# Patient Record
Sex: FEMALE | Race: WHITE | Employment: OTHER | URBAN - METROPOLITAN AREA
[De-identification: names, ages, dates, MRNs, and addresses within clinical notes are randomized per-mention and may not be internally consistent; named-entity substitution may affect disease eponyms.]

---

## 2017-10-25 ENCOUNTER — APPOINTMENT (OUTPATIENT)
Dept: GENERAL RADIOLOGY | Age: 82
End: 2017-10-25
Attending: EMERGENCY MEDICINE
Payer: MEDICARE

## 2017-10-25 ENCOUNTER — HOSPITAL ENCOUNTER (EMERGENCY)
Age: 82
Discharge: HOME | End: 2017-10-26
Payer: MEDICARE
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
## 2017-10-25 DIAGNOSIS — S32.010D CLOSED COMPRESSION FRACTURE OF L1 LUMBAR VERTEBRA WITH ROUTINE HEALING, SUBSEQUENT ENCOUNTER: ICD-10-CM

## 2017-10-25 PROCEDURE — 96375 TX/PRO/DX INJ NEW DRUG ADDON: CPT

## 2017-10-25 PROCEDURE — 72100 X-RAY EXAM L-S SPINE 2/3 VWS: CPT

## 2017-10-25 PROCEDURE — 74011250637 HC RX REV CODE- 250/637

## 2017-10-25 PROCEDURE — 99285 EMERGENCY DEPT VISIT HI MDM: CPT

## 2017-10-25 PROCEDURE — 96374 THER/PROPH/DIAG INJ IV PUSH: CPT

## 2017-10-25 RX ORDER — HYDROCODONE BITARTRATE AND ACETAMINOPHEN 7.5; 325 MG/1; MG/1
1 TABLET ORAL
Status: COMPLETED | OUTPATIENT
Start: 2017-10-25 | End: 2017-10-25

## 2017-10-25 RX ORDER — HYDROCODONE BITARTRATE AND ACETAMINOPHEN 7.5; 325 MG/1; MG/1
1 TABLET ORAL
Qty: 20 TAB | Refills: 0 | Status: SHIPPED | OUTPATIENT
Start: 2017-10-25 | End: 2018-01-08

## 2017-10-25 RX ADMIN — HYDROCODONE BITARTRATE AND ACETAMINOPHEN 1 TABLET: 7.5; 325 TABLET ORAL at 23:56

## 2017-10-26 ENCOUNTER — HOME HEALTH ADMISSION (OUTPATIENT)
Dept: HOME HEALTH SERVICES | Facility: HOME HEALTH | Age: 82
End: 2017-10-26
Payer: MEDICARE

## 2017-10-26 VITALS
OXYGEN SATURATION: 99 % | RESPIRATION RATE: 16 BRPM | BODY MASS INDEX: 29.2 KG/M2 | HEART RATE: 93 BPM | DIASTOLIC BLOOD PRESSURE: 59 MMHG | WEIGHT: 204 LBS | SYSTOLIC BLOOD PRESSURE: 121 MMHG | TEMPERATURE: 97.6 F | HEIGHT: 70 IN

## 2017-10-26 PROCEDURE — 96375 TX/PRO/DX INJ NEW DRUG ADDON: CPT

## 2017-10-26 PROCEDURE — 96374 THER/PROPH/DIAG INJ IV PUSH: CPT

## 2017-10-26 PROCEDURE — 74011250636 HC RX REV CODE- 250/636

## 2017-10-26 RX ORDER — HYDROMORPHONE HYDROCHLORIDE 1 MG/ML
1 INJECTION, SOLUTION INTRAMUSCULAR; INTRAVENOUS; SUBCUTANEOUS
Status: COMPLETED | OUTPATIENT
Start: 2017-10-26 | End: 2017-10-26

## 2017-10-26 RX ORDER — ONDANSETRON 2 MG/ML
4 INJECTION INTRAMUSCULAR; INTRAVENOUS
Status: COMPLETED | OUTPATIENT
Start: 2017-10-26 | End: 2017-10-26

## 2017-10-26 RX ADMIN — ONDANSETRON 4 MG: 2 INJECTION INTRAMUSCULAR; INTRAVENOUS at 00:51

## 2017-10-26 RX ADMIN — HYDROMORPHONE HYDROCHLORIDE 1 MG: 1 INJECTION, SOLUTION INTRAMUSCULAR; INTRAVENOUS; SUBCUTANEOUS at 00:51

## 2017-10-26 NOTE — PROGRESS NOTES
[address]  Face to Face Encounter    Patients Name: Miracle Davis    YOB: 1923    Ordering Physician: Johnny De La Vega    Primary Diagnosis: Fall, Compression fracture of L1 lumbar vertebra with routine healing  Date of Face to Face:   10/25/17                                  Face to Face Encounter findings are related to primary reason for home care:   yes. 1. I certify that the patient needs intermittent care as follows: physical therapy: strengthening, stretching/ROM, transfer training and balance training    2. I certify that this patient is homebound, that is: 1) patient requires the use of a walker device, special transportation, or assistance of another to leave the home; or 2) patient's condition makes leaving the home medically contraindicated; and 3) patient has a normal inability to leave the home and leaving the home requires considerable and taxing effort. Patient may leave the home for infrequent and short duration for medical reasons, and occasional absences for non-medical reasons. Homebound status is due to the following functional limitations: Patient with strength deficits limiting the performance of all ADL's without caregiver assistance or the use of an assistive device. Patient with poor safety awareness and is at risk for falls without assistance of another person and the use of an assistive device. Patient with poor ambulation endurance limiting their safe ability to ascend/descend the required number of steps to leave the home. 3. I certify that this patient is under my care and that I, or a nurse practitioner or [phone], or clinical nurse specialist, or certified nurse midwife, working with me, had a Face-to-Face Encounter that meets the physician Face-to-Face Encounter requirements.   The following are the clinical findings from the [address] [place] [address] encounter that support the need for skilled services and is a summary of the encounter: see progress notes     See attached progess note and summary of the patient's illness      Killian Clark RN  10/26/2017      THE FOLLOWING TO BE COMPLETED BY THE COMMUNITY PHYSICIAN:    I concur with the findings described above from the F2F encounter that this patient is homebound and in need of a skilled service.     Certifying Physician: _____________________________________      Printed Certifying Physician Name: _____________________________________    Date: _________________

## 2017-10-26 NOTE — ED NOTES
I have reviewed discharge instructions with the patient. The patient verbalized understanding. Patient left ED via Discharge Method: stretcher to Home with thorn transport    Opportunity for questions and clarification provided. Patient given 1 scripts.

## 2017-10-26 NOTE — PROGRESS NOTES
Referral from ED noting 'patient lives alone and has fallen'. Per Dr Vasquez Covert notes would like home health PT. Call to son who states that patient has lived alone for many years and up until 2 days ago was very independent in her ADLs. States she does not drive but she showers, dresses and prepares her meals on her own. States she uses a walker for ambulation. States he drives her where she needs to go and brings her groceries a couple of times a week. States he is an only child. States patient has old compression fractures and she was washing windows the other day and 'pinched' something. Since then has had significant pain and is confined to bed with commode next to it. Son states she has an appointment with her PCP on Monday and would love for PT to come in and help her in the meantime. Denies need for nurses aide - states he will take care of her. Son does not have agency preference so notified him that I would make referral to [facility] and someone would be calling him. Call to Renée Le at [facility] and referral made.

## 2017-10-26 NOTE — ED PROVIDER NOTES
HPI Comments: 77-year-old female complaining of low back pain. Patient fell today. She was using her walker when she lost her balance and legs gave out. Patient's had this happen several times in the past.  Patient has a history of chronic back pain. Pain is located in a similar placed her previous pain. Patient is a 80 y.o. female presenting with back pain. The history is provided by the patient and a relative. Back Pain    This is a recurrent problem. The problem has been gradually worsening. The problem occurs constantly. Patient reports not work related injury. The pain is associated with a fall. The pain is present in the lower back. The quality of the pain is described as aching. The pain does not radiate. The pain is at a severity of 8/10. The pain is moderate. Pertinent negatives include no chest pain, no fever, no numbness, no weight loss, no headaches, no abdominal pain, no abdominal swelling, no bowel incontinence, no perianal numbness, no bladder incontinence, no dysuria, no pelvic pain, no leg pain, no paresthesias, no paresis, no tingling and no weakness. She has tried nothing for the symptoms.         Past Medical History:   Diagnosis Date    Compression fracture of lumbar spine, non-traumatic ([facility] [address] 2007    Depression 9/18/2012    DJD (degenerative joint disease) 9/18/2012    GERD (gastroesophageal reflux disease) 9/18/2012    HTN (hypertension) 9/18/2012    Hypertension     NATALIIA on CPAP 9/18/2012    Osteoporosis 9/18/2012    Other ill-defined conditions(799.89)     back injury in Oct    Psychiatric disorder     anixety       Past Surgical History:   Procedure Laterality Date    HX CATARACT REMOVAL      HX HEENT      tonsillectomy         Family History:   Problem Relation Age of Onset    Stroke Mother        Social History     Social History    Marital status:      Spouse name: N/A    Number of children: N/A    Years of education: N/A     Occupational History    Not on file.     Social History Main Topics    Smoking status: Never Smoker    Smokeless tobacco: Never Used    Alcohol use No    Drug use: No    Sexual activity: No     Other Topics Concern    Not on file     Social History Narrative    / 3 children         ALLERGIES: Review of patient's allergies indicates no known allergies. Review of Systems   Constitutional: Negative. Negative for activity change, fever and weight loss. HENT: Negative. Eyes: Negative. Respiratory: Negative. Cardiovascular: Negative. Negative for chest pain. Gastrointestinal: Negative. Negative for abdominal pain and bowel incontinence. Genitourinary: Negative. Negative for bladder incontinence, dysuria and pelvic pain. Musculoskeletal: Positive for back pain. Skin: Negative. Neurological: Negative. Negative for tingling, weakness, numbness, headaches and paresthesias. Psychiatric/Behavioral: Negative. All other systems reviewed and are negative. Vitals:    10/25/17 2239   Pulse: 90   Resp: 16   Temp: 97.6 °F (36.4 °C)   SpO2: 90%   Weight: 92.5 kg (204 lb)   Height: 5' 10\" (1.778 m)            Physical Exam   Constitutional: She is oriented to person, place, and time. She appears well-developed and well-nourished. No distress. HENT:   Head: Normocephalic and atraumatic. Right Ear: External ear normal.   Left Ear: External ear normal.   Nose: Nose normal.   Mouth/Throat: Oropharynx is clear and moist. No oropharyngeal exudate. Eyes: Conjunctivae and EOM are normal. Pupils are equal, round, and reactive to light. Right eye exhibits no discharge. Left eye exhibits no discharge. No scleral icterus. Neck: Normal range of motion. Neck supple. No JVD present. No tracheal deviation present. Cardiovascular: Normal rate, regular rhythm and intact distal pulses. Pulmonary/Chest: Effort normal and breath sounds normal. No stridor. No respiratory distress. She has no wheezes.  She exhibits no tenderness. Abdominal: Soft. Bowel sounds are normal. She exhibits no distension and no mass. There is no tenderness. Musculoskeletal: Normal range of motion. She exhibits no edema or tenderness. Neurological: She is alert and oriented to person, place, and time. No cranial nerve deficit. Skin: Skin is warm and dry. No rash noted. She is not diaphoretic. No erythema. No pallor. Psychiatric: She has a normal mood and affect. Her behavior is normal. Thought content normal.   Nursing note and vitals reviewed. MDM  Number of Diagnoses or Management Options  Diagnosis management comments: Pain is over L1 which is reportedly a chronic compression fracture of L1. Patient's neurovascularly intact. Do not see a standard hospital admission criteria. We will ask home health to visit the patient for assistance in ambulation and physical therapy. We will try to get her pain under control and have her follow-up with her primary care physician.        Amount and/or Complexity of Data Reviewed  Tests in the radiology section of CPT®: ordered and reviewed      ED Course       Procedures

## 2017-10-26 NOTE — DISCHARGE INSTRUCTIONS
Compression Fracture of the Spine: Care Instructions  Your Care Instructions    A compression fracture happens when the front part of a spinal bone breaks and collapses. A fall or other accident can cause it. A minor injury or moving the wrong way can cause a break if you have thin or brittle bones (osteoporosis). These types of breaks will heal in 8 to 10 weeks. You will need rest and pain medicines. Your doctor may recommend physical therapy. Some doctors recommend that certain people with compression fractures wear braces. Your doctor also may treat thin or brittle bones. You may need surgery if you have lasting pain or if the bone presses on the spinal cord or nerves. You heal best when you take good care of yourself. Eat a variety of healthy foods, and don't smoke. Follow-up care is a key part of your treatment and safety. Be sure to make and go to all appointments, and call your doctor if you are having problems. It's also a good idea to know your test results and keep a list of the medicines you take. How can you care for yourself at home? · Be safe with medicines. Read and follow all instructions on the label. ¨ If the doctor gave you a prescription medicine for pain, take it as prescribed. ¨ If you are not taking a prescription pain medicine, ask your doctor if you can take an over-the-counter medicine. · Talk to your doctor about how to make your bones stronger. You may need medicines or a change in what you eat. · Be active only as directed by your doctor. When should you call for help? Call 911 anytime you think you may need emergency care. For example, call if:  ? · You are unable to move an arm or a leg at all. ?Call your doctor now or seek immediate medical care if:  ? · You have new or worse symptoms in your arms, legs, belly, or buttocks. Symptoms may include:  ¨ Numbness or tingling. ¨ Weakness. ¨ Pain. ? · You lose bladder or bowel control.    ? · You have belly pain, bloating, vomiting, or nausea. ? Watch closely for changes in your health, and be sure to contact your doctor if:  ? · You do not get better as expected. Where can you learn more? Go to http://di-angy.info/. Enter P445 in the search box to learn more about \"Compression Fracture of the Spine: Care Instructions. \"  Current as of: March 21, 2017  Content Version: 11.4  © 4278-6226 [employer]. Care instructions adapted under license by [employer] (which disclaims liability or warranty for this information). If you have questions about a medical condition or this instruction, always ask your healthcare professional. [place] [address] any warranty or liability for your use of this information. Preventing Falls: Care Instructions  Your Care Instructions    Getting around your home safely can be a challenge if you have injuries or health problems that make it easy for you to fall. Loose rugs and furniture in walkways are among the dangers for many older people who have problems walking or who have poor eyesight. People who have conditions such as arthritis, osteoporosis, or dementia also have to be careful not to fall. You can make your home safer with a few simple measures. Follow-up care is a key part of your treatment and safety. Be sure to make and go to all appointments, and call your doctor if you are having problems. It's also a good idea to know your test results and keep a list of the medicines you take. How can you care for yourself at home? Taking care of yourself  · You may get dizzy if you do not drink enough water. To prevent dehydration, drink plenty of fluids, enough so that your urine is light yellow or clear like water. Choose water and other caffeine-free clear liquids. If you have kidney, heart, or liver disease and have to limit fluids, talk with your doctor before you increase the amount of fluids you drink.   · Exercise regularly to improve your strength, muscle tone, and balance. Walk if you can. Swimming may be a good choice if you cannot walk easily. · Have your vision and hearing checked each year or any time you notice a change. If you have trouble seeing and hearing, you might not be able to avoid objects and could lose your balance. · Know the side effects of the medicines you take. Ask your doctor or pharmacist whether the medicines you take can affect your balance. Sleeping pills or sedatives can affect your balance. · Limit the amount of alcohol you drink. Alcohol can impair your balance and other senses. · Ask your doctor whether calluses or corns on your feet need to be removed. If you wear loose-fitting shoes because of calluses or corns, you can lose your balance and fall. · Talk to your doctor if you have numbness in your feet. Preventing falls at home  · Remove raised doorway thresholds, throw rugs, and clutter. Repair loose carpet or raised areas in the floor. · Move furniture and electrical cords to keep them out of walking paths. · Use nonskid floor wax, and wipe up spills right away, especially on ceramic tile floors. · If you use a walker or cane, put rubber tips on it. If you use crutches, clean the bottoms of them regularly with an abrasive pad, such as steel wool. · Keep your house well lit, especially [place] [employer], and outside walkways. Use night-lights in areas such as hallways and bathrooms. Add extra light switches or use remote switches (such as switches that go on or off when you clap your hands) to make it easier to turn lights on if you have to get up during the night. · Install sturdy handrails on stairways. · Move items in your cabinets so that the things you use a lot are on the lower shelves (about waist level). · Keep a cordless phone and a flashlight with new batteries by your bed.  If possible, put a phone in each of the main rooms of your house, or carry a cell phone in case you fall and cannot reach a phone. Or, you can wear a device around your neck or wrist. You push a button that sends a signal for help. · Wear low-heeled shoes that fit well and give your feet good support. Use footwear with nonskid soles. Check the heels and soles of your shoes for wear. Repair or replace worn heels or soles. · Do not wear socks without shoes on wood floors. · Walk on the grass when the sidewalks are slippery. If you live in an area that gets snow and ice in the winter, sprinkle salt on slippery steps and sidewalks. Preventing falls in the bath  · Install grab bars and nonskid mats inside and outside your shower or tub and near the toilet and sinks. · Use shower chairs and bath benches. · Use a hand-held shower head that will allow you to sit while showering. · Get into a tub or shower by putting the weaker leg in first. Get out of a tub or shower with your strong side first.  · Repair loose toilet seats and consider installing a raised toilet seat to make getting on and off the toilet easier. · Keep your bathroom door unlocked while you are in the shower. Where can you learn more? Go to http://di-angy.info/. Enter 0476 [id number] 69 [id number] in the search box to learn more about \"Preventing Falls: Care Instructions. \"  Current as of: May 12, 2017  Content Version: 11.4  © 6268-6423 [employer], [facility]. Care instructions adapted under license by [employer] (which disclaims liability or warranty for this information). If you have questions about a medical condition or this instruction, always ask your healthcare professional. [place] [address] any warranty or liability for your use of this information.

## 2017-10-26 NOTE — ED NOTES
Told son to go home. We will send pt by rafael when pain is under control. Oral med not working. Notified md. Orders received and meds given.

## 2017-10-26 NOTE — ED TRIAGE NOTES
Per EMS,  Pt has been having lower back pain that she states felt like a nerve \"pop'.   Pt did fall tonight and now c/o lower back pain

## 2017-10-28 ENCOUNTER — HOME CARE VISIT (OUTPATIENT)
Dept: HOME HEALTH SERVICES | Facility: HOME HEALTH | Age: 82
End: 2017-10-28

## 2017-10-28 ENCOUNTER — HOME CARE VISIT (OUTPATIENT)
Dept: SCHEDULING | Facility: HOME HEALTH | Age: 82
End: 2017-10-28
Payer: MEDICARE

## 2017-10-28 VITALS
SYSTOLIC BLOOD PRESSURE: 150 MMHG | OXYGEN SATURATION: 95 % | DIASTOLIC BLOOD PRESSURE: 70 MMHG | HEART RATE: 93 BPM | TEMPERATURE: 96 F | RESPIRATION RATE: 20 BRPM

## 2017-10-28 PROCEDURE — 3331090001 HH PPS REVENUE CREDIT

## 2017-10-28 PROCEDURE — 3331090002 HH PPS REVENUE DEBIT

## 2017-10-28 PROCEDURE — G0151 HHCP-SERV OF PT,EA 15 MIN: HCPCS

## 2017-10-28 PROCEDURE — 400013 HH SOC

## 2017-10-29 PROCEDURE — 3331090001 HH PPS REVENUE CREDIT

## 2017-10-29 PROCEDURE — 3331090002 HH PPS REVENUE DEBIT

## 2017-10-30 ENCOUNTER — PATIENT OUTREACH (OUTPATIENT)
Dept: CASE MANAGEMENT | Age: 82
End: 2017-10-30

## 2017-10-30 PROCEDURE — 3331090001 HH PPS REVENUE CREDIT

## 2017-10-30 PROCEDURE — 3331090002 HH PPS REVENUE DEBIT

## 2017-10-30 NOTE — PROGRESS NOTES
Date/Time of Call:       10/27/17 10:20 AM   What was the patient seen in the ED for? Patient was seen in ED for diagnosis of Fall, closed compression    Does the patient understand his/her diagnosis and/or treatment and what happened during the ED visit? Spoke with patient who stated understanding of treatment and diagnosis. Did the patient receive discharge instructions from the ED? Patient stated receiving d/c instructions from the ED. Review any discharge instructions (see notes in [facility]). Ask patient if they understand these. Do they have any questions? Patient and Care Coordinator reviewed DC instructions. Patient stated understanding and no questions asked. Were home services ordered (nursing, PT, OT, ST, etc.)? No HH ordered at d/c   If so, has the first visit occurred? If not, why? (Assist with coordination of services if necessary.) N/A   DME ordered at d/c? No DME ordered at d/c. If so, has it been received? If not, why?  (Assist with coordination of arranging DME orders if necessary.) N/A   Complete a review of all medications (new, continued and discontinued meds per the D/C instructions and medication tab in [address] [place] [address]). Review of medications has been completed by Patient and Care Coordinator. Were all new prescriptions filled? If not, why?  (Assist with obtainment of medications if necessary.) N/A   Does the patient understand the purpose and dosing instructions for all medications? (If patient has questions, provide explanation and education.) Patient stated understanding of purpose, and dosing instructions for all medications. [place] prescribed at d/c. Does the patient have any problems in performing ADLs? (If patient is unable to perform ADLs  what is the limiting factor(s)?   Do they have a support system that can assist? If no support system is present, discuss possible assistance that they may be able to obtain.) Patient states she is independent with all ADLs. Does the patient have all follow-up appointments scheduled? Has transportation been arranged? 7 day f/up with PCP?    7-14 day f/up with specialist?    If f/up has not been made  what actions has the care coordinator made to accomplish this? Has transportation been arranged? [facility] follow-up should be within 7 days of discharge; all others should have PCP follow-up within 7 days of discharge; follow-ups with other specialists as appropriate or ordered.) PCP appt. 10/30. Patient denies need for transportation. Any other questions or concerns expressed by the patient? No other questions asked. No further needs identified. Gratitude expressed for care and call. RENETTA Call Completed By: Mario Cra LPN   Care Coordinator  [facility]                                 This note will not be viewable in [address]

## 2017-10-31 ENCOUNTER — HOME CARE VISIT (OUTPATIENT)
Dept: SCHEDULING | Facility: HOME HEALTH | Age: 82
End: 2017-10-31
Payer: MEDICARE

## 2017-10-31 VITALS
SYSTOLIC BLOOD PRESSURE: 122 MMHG | DIASTOLIC BLOOD PRESSURE: 70 MMHG | OXYGEN SATURATION: 90 % | HEART RATE: 78 BPM | RESPIRATION RATE: 17 BRPM | TEMPERATURE: 97.3 F

## 2017-10-31 PROCEDURE — G0157 HHC PT ASSISTANT EA 15: HCPCS

## 2017-10-31 PROCEDURE — 3331090002 HH PPS REVENUE DEBIT

## 2017-10-31 PROCEDURE — 3331090001 HH PPS REVENUE CREDIT

## 2017-11-01 PROCEDURE — 3331090002 HH PPS REVENUE DEBIT

## 2017-11-01 PROCEDURE — 3331090001 HH PPS REVENUE CREDIT

## 2017-11-02 ENCOUNTER — HOME CARE VISIT (OUTPATIENT)
Dept: HOME HEALTH SERVICES | Facility: HOME HEALTH | Age: 82
End: 2017-11-02
Payer: MEDICARE

## 2017-11-02 ENCOUNTER — HOME CARE VISIT (OUTPATIENT)
Dept: SCHEDULING | Facility: HOME HEALTH | Age: 82
End: 2017-11-02
Payer: MEDICARE

## 2017-11-02 VITALS
HEART RATE: 76 BPM | DIASTOLIC BLOOD PRESSURE: 64 MMHG | RESPIRATION RATE: 20 BRPM | SYSTOLIC BLOOD PRESSURE: 124 MMHG | TEMPERATURE: 97.6 F

## 2017-11-02 PROCEDURE — G0157 HHC PT ASSISTANT EA 15: HCPCS

## 2017-11-02 PROCEDURE — 3331090002 HH PPS REVENUE DEBIT

## 2017-11-02 PROCEDURE — 3331090001 HH PPS REVENUE CREDIT

## 2017-11-03 PROCEDURE — 3331090001 HH PPS REVENUE CREDIT

## 2017-11-03 PROCEDURE — 3331090002 HH PPS REVENUE DEBIT

## 2017-11-04 PROCEDURE — 3331090001 HH PPS REVENUE CREDIT

## 2017-11-04 PROCEDURE — 3331090002 HH PPS REVENUE DEBIT

## 2017-11-05 PROCEDURE — 3331090001 HH PPS REVENUE CREDIT

## 2017-11-05 PROCEDURE — 3331090002 HH PPS REVENUE DEBIT

## 2017-11-06 ENCOUNTER — HOME CARE VISIT (OUTPATIENT)
Dept: HOME HEALTH SERVICES | Facility: HOME HEALTH | Age: 82
End: 2017-11-06
Payer: MEDICARE

## 2017-11-06 PROCEDURE — 3331090001 HH PPS REVENUE CREDIT

## 2017-11-06 PROCEDURE — 3331090002 HH PPS REVENUE DEBIT

## 2017-11-07 ENCOUNTER — HOME CARE VISIT (OUTPATIENT)
Dept: SCHEDULING | Facility: HOME HEALTH | Age: 82
End: 2017-11-07
Payer: MEDICARE

## 2017-11-07 VITALS
RESPIRATION RATE: 18 BRPM | TEMPERATURE: 98 F | HEART RATE: 74 BPM | OXYGEN SATURATION: 91 % | SYSTOLIC BLOOD PRESSURE: 122 MMHG | DIASTOLIC BLOOD PRESSURE: 60 MMHG

## 2017-11-07 PROCEDURE — 3331090001 HH PPS REVENUE CREDIT

## 2017-11-07 PROCEDURE — 3331090002 HH PPS REVENUE DEBIT

## 2017-11-07 PROCEDURE — G0157 HHC PT ASSISTANT EA 15: HCPCS

## 2017-11-08 PROCEDURE — 3331090001 HH PPS REVENUE CREDIT

## 2017-11-08 PROCEDURE — 3331090002 HH PPS REVENUE DEBIT

## 2017-11-09 ENCOUNTER — HOME CARE VISIT (OUTPATIENT)
Dept: SCHEDULING | Facility: HOME HEALTH | Age: 82
End: 2017-11-09
Payer: MEDICARE

## 2017-11-09 VITALS
TEMPERATURE: 97.1 F | HEART RATE: 88 BPM | SYSTOLIC BLOOD PRESSURE: 122 MMHG | RESPIRATION RATE: 20 BRPM | DIASTOLIC BLOOD PRESSURE: 60 MMHG

## 2017-11-09 PROCEDURE — G0157 HHC PT ASSISTANT EA 15: HCPCS

## 2017-11-09 PROCEDURE — 3331090002 HH PPS REVENUE DEBIT

## 2017-11-09 PROCEDURE — 3331090001 HH PPS REVENUE CREDIT

## 2017-11-10 ENCOUNTER — HOME CARE VISIT (OUTPATIENT)
Dept: HOME HEALTH SERVICES | Facility: HOME HEALTH | Age: 82
End: 2017-11-10
Payer: MEDICARE

## 2017-11-10 ENCOUNTER — HOME CARE VISIT (OUTPATIENT)
Dept: SCHEDULING | Facility: HOME HEALTH | Age: 82
End: 2017-11-10
Payer: MEDICARE

## 2017-11-10 PROCEDURE — G0299 HHS/HOSPICE OF RN EA 15 MIN: HCPCS

## 2017-11-10 PROCEDURE — 3331090001 HH PPS REVENUE CREDIT

## 2017-11-10 PROCEDURE — 3331090002 HH PPS REVENUE DEBIT

## 2017-11-11 ENCOUNTER — HOME CARE VISIT (OUTPATIENT)
Dept: HOME HEALTH SERVICES | Facility: HOME HEALTH | Age: 82
End: 2017-11-11
Payer: MEDICARE

## 2017-11-11 PROCEDURE — 3331090003 HH PPS REVENUE ADJ

## 2017-11-11 PROCEDURE — 3331090002 HH PPS REVENUE DEBIT

## 2017-11-11 PROCEDURE — 3331090001 HH PPS REVENUE CREDIT

## 2017-11-12 PROCEDURE — 3331090002 HH PPS REVENUE DEBIT

## 2017-11-12 PROCEDURE — 3331090001 HH PPS REVENUE CREDIT

## 2017-11-13 PROCEDURE — 3331090002 HH PPS REVENUE DEBIT

## 2017-11-13 PROCEDURE — 3331090001 HH PPS REVENUE CREDIT

## 2017-11-14 PROCEDURE — 3331090002 HH PPS REVENUE DEBIT

## 2017-11-14 PROCEDURE — 3331090001 HH PPS REVENUE CREDIT

## 2017-11-15 PROCEDURE — 3331090001 HH PPS REVENUE CREDIT

## 2017-11-15 PROCEDURE — 3331090002 HH PPS REVENUE DEBIT

## 2017-11-16 PROCEDURE — 3331090002 HH PPS REVENUE DEBIT

## 2017-11-16 PROCEDURE — 3331090001 HH PPS REVENUE CREDIT

## 2017-11-17 PROCEDURE — 3331090001 HH PPS REVENUE CREDIT

## 2017-11-17 PROCEDURE — 3331090002 HH PPS REVENUE DEBIT

## 2017-11-18 PROCEDURE — 3331090002 HH PPS REVENUE DEBIT

## 2017-11-18 PROCEDURE — 3331090001 HH PPS REVENUE CREDIT

## 2017-11-19 PROCEDURE — 3331090002 HH PPS REVENUE DEBIT

## 2017-11-19 PROCEDURE — 3331090001 HH PPS REVENUE CREDIT

## 2017-11-20 PROCEDURE — 3331090002 HH PPS REVENUE DEBIT

## 2017-11-20 PROCEDURE — 3331090001 HH PPS REVENUE CREDIT

## 2017-11-21 PROCEDURE — 3331090002 HH PPS REVENUE DEBIT

## 2017-11-21 PROCEDURE — 3331090001 HH PPS REVENUE CREDIT

## 2017-11-22 PROCEDURE — 3331090002 HH PPS REVENUE DEBIT

## 2017-11-22 PROCEDURE — 3331090001 HH PPS REVENUE CREDIT

## 2017-11-23 PROCEDURE — 3331090001 HH PPS REVENUE CREDIT

## 2017-11-23 PROCEDURE — 3331090002 HH PPS REVENUE DEBIT

## 2017-11-24 PROCEDURE — 3331090001 HH PPS REVENUE CREDIT

## 2017-11-24 PROCEDURE — 3331090002 HH PPS REVENUE DEBIT

## 2017-11-25 PROCEDURE — 3331090002 HH PPS REVENUE DEBIT

## 2017-11-25 PROCEDURE — 3331090001 HH PPS REVENUE CREDIT

## 2017-11-26 PROCEDURE — 3331090002 HH PPS REVENUE DEBIT

## 2017-11-26 PROCEDURE — 3331090001 HH PPS REVENUE CREDIT

## 2017-11-27 VITALS
OXYGEN SATURATION: 98 % | DIASTOLIC BLOOD PRESSURE: 80 MMHG | TEMPERATURE: 97.6 F | RESPIRATION RATE: 18 BRPM | SYSTOLIC BLOOD PRESSURE: 138 MMHG | HEART RATE: 82 BPM

## 2017-11-27 PROCEDURE — 3331090002 HH PPS REVENUE DEBIT

## 2017-11-27 PROCEDURE — 3331090001 HH PPS REVENUE CREDIT

## 2017-11-28 PROCEDURE — 3331090001 HH PPS REVENUE CREDIT

## 2017-11-28 PROCEDURE — 3331090002 HH PPS REVENUE DEBIT

## 2017-11-29 PROCEDURE — 3331090001 HH PPS REVENUE CREDIT

## 2017-11-29 PROCEDURE — 3331090002 HH PPS REVENUE DEBIT

## 2017-11-30 PROCEDURE — 3331090002 HH PPS REVENUE DEBIT

## 2017-11-30 PROCEDURE — 3331090001 HH PPS REVENUE CREDIT

## 2017-12-01 ENCOUNTER — HOME CARE VISIT (OUTPATIENT)
Dept: SCHEDULING | Facility: HOME HEALTH | Age: 82
End: 2017-12-01
Payer: MEDICARE

## 2017-12-01 PROCEDURE — 3331090002 HH PPS REVENUE DEBIT

## 2017-12-01 PROCEDURE — G0151 HHCP-SERV OF PT,EA 15 MIN: HCPCS

## 2017-12-01 PROCEDURE — 3331090001 HH PPS REVENUE CREDIT

## 2017-12-02 PROCEDURE — 3331090002 HH PPS REVENUE DEBIT

## 2017-12-02 PROCEDURE — 3331090001 HH PPS REVENUE CREDIT

## 2017-12-03 PROCEDURE — 3331090002 HH PPS REVENUE DEBIT

## 2017-12-03 PROCEDURE — 3331090001 HH PPS REVENUE CREDIT

## 2017-12-04 ENCOUNTER — HOME CARE VISIT (OUTPATIENT)
Dept: HOME HEALTH SERVICES | Facility: HOME HEALTH | Age: 82
End: 2017-12-04
Payer: MEDICARE

## 2017-12-04 ENCOUNTER — PATIENT OUTREACH (OUTPATIENT)
Dept: CASE MANAGEMENT | Age: 82
End: 2017-12-04

## 2017-12-04 PROCEDURE — 3331090002 HH PPS REVENUE DEBIT

## 2017-12-04 PROCEDURE — 3331090001 HH PPS REVENUE CREDIT

## 2017-12-05 VITALS
SYSTOLIC BLOOD PRESSURE: 118 MMHG | OXYGEN SATURATION: 95 % | DIASTOLIC BLOOD PRESSURE: 64 MMHG | HEART RATE: 75 BPM | TEMPERATURE: 97.9 F | RESPIRATION RATE: 18 BRPM

## 2017-12-05 PROCEDURE — 3331090001 HH PPS REVENUE CREDIT

## 2017-12-05 PROCEDURE — 3331090002 HH PPS REVENUE DEBIT

## 2017-12-06 PROCEDURE — 3331090002 HH PPS REVENUE DEBIT

## 2017-12-06 PROCEDURE — 3331090001 HH PPS REVENUE CREDIT

## 2017-12-07 PROCEDURE — 3331090001 HH PPS REVENUE CREDIT

## 2017-12-07 PROCEDURE — 3331090002 HH PPS REVENUE DEBIT

## 2017-12-08 PROCEDURE — 3331090002 HH PPS REVENUE DEBIT

## 2017-12-08 PROCEDURE — 3331090001 HH PPS REVENUE CREDIT

## 2017-12-09 PROCEDURE — 3331090002 HH PPS REVENUE DEBIT

## 2017-12-09 PROCEDURE — 3331090001 HH PPS REVENUE CREDIT

## 2017-12-10 PROCEDURE — 3331090002 HH PPS REVENUE DEBIT

## 2017-12-10 PROCEDURE — 3331090001 HH PPS REVENUE CREDIT

## 2017-12-11 PROCEDURE — 3331090001 HH PPS REVENUE CREDIT

## 2017-12-11 PROCEDURE — 3331090002 HH PPS REVENUE DEBIT

## 2017-12-12 PROCEDURE — 3331090002 HH PPS REVENUE DEBIT

## 2017-12-12 PROCEDURE — 3331090001 HH PPS REVENUE CREDIT

## 2017-12-13 PROCEDURE — 3331090001 HH PPS REVENUE CREDIT

## 2017-12-13 PROCEDURE — 3331090002 HH PPS REVENUE DEBIT

## 2017-12-14 PROCEDURE — 3331090002 HH PPS REVENUE DEBIT

## 2017-12-14 PROCEDURE — 3331090001 HH PPS REVENUE CREDIT

## 2017-12-15 PROCEDURE — 3331090002 HH PPS REVENUE DEBIT

## 2017-12-15 PROCEDURE — 3331090001 HH PPS REVENUE CREDIT

## 2017-12-16 PROCEDURE — 3331090002 HH PPS REVENUE DEBIT

## 2017-12-16 PROCEDURE — 3331090001 HH PPS REVENUE CREDIT

## 2017-12-17 PROCEDURE — 3331090002 HH PPS REVENUE DEBIT

## 2017-12-17 PROCEDURE — 3331090001 HH PPS REVENUE CREDIT

## 2017-12-18 PROCEDURE — 3331090002 HH PPS REVENUE DEBIT

## 2017-12-18 PROCEDURE — 3331090001 HH PPS REVENUE CREDIT

## 2017-12-19 PROCEDURE — 3331090002 HH PPS REVENUE DEBIT

## 2017-12-19 PROCEDURE — 3331090001 HH PPS REVENUE CREDIT

## 2017-12-20 PROCEDURE — 3331090001 HH PPS REVENUE CREDIT

## 2017-12-20 PROCEDURE — 3331090002 HH PPS REVENUE DEBIT

## 2017-12-21 PROCEDURE — 3331090002 HH PPS REVENUE DEBIT

## 2017-12-21 PROCEDURE — 3331090001 HH PPS REVENUE CREDIT

## 2017-12-22 PROCEDURE — 3331090001 HH PPS REVENUE CREDIT

## 2017-12-22 PROCEDURE — 3331090002 HH PPS REVENUE DEBIT

## 2017-12-23 PROCEDURE — 3331090001 HH PPS REVENUE CREDIT

## 2017-12-23 PROCEDURE — 3331090002 HH PPS REVENUE DEBIT

## 2017-12-24 PROCEDURE — 3331090002 HH PPS REVENUE DEBIT

## 2017-12-24 PROCEDURE — 3331090001 HH PPS REVENUE CREDIT

## 2017-12-25 PROCEDURE — 3331090002 HH PPS REVENUE DEBIT

## 2017-12-25 PROCEDURE — 3331090001 HH PPS REVENUE CREDIT

## 2017-12-26 PROCEDURE — 3331090001 HH PPS REVENUE CREDIT

## 2017-12-26 PROCEDURE — 3331090002 HH PPS REVENUE DEBIT

## 2017-12-26 PROCEDURE — 3331090003 HH PPS REVENUE ADJ

## 2017-12-31 ENCOUNTER — ANESTHESIA (OUTPATIENT)
Dept: SURGERY | Age: 82
DRG: 329 | End: 2017-12-31
Payer: MEDICARE

## 2017-12-31 ENCOUNTER — HOSPITAL ENCOUNTER (INPATIENT)
Age: 82
LOS: 8 days | Discharge: NURSING, REHAB OR LONG-TERM CARE | DRG: 329 | End: 2018-01-08
Attending: EMERGENCY MEDICINE | Admitting: SURGERY
Payer: MEDICARE

## 2017-12-31 ENCOUNTER — ANESTHESIA EVENT (OUTPATIENT)
Dept: SURGERY | Age: 82
DRG: 329 | End: 2017-12-31
Payer: MEDICARE

## 2017-12-31 ENCOUNTER — APPOINTMENT (OUTPATIENT)
Dept: CT IMAGING | Age: 82
DRG: 329 | End: 2017-12-31
Attending: EMERGENCY MEDICINE
Payer: MEDICARE

## 2017-12-31 DIAGNOSIS — K42.0 STRANGULATED UMBILICAL HERNIA: Primary | ICD-10-CM

## 2017-12-31 DIAGNOSIS — I10 ESSENTIAL HYPERTENSION: ICD-10-CM

## 2017-12-31 LAB
ALBUMIN SERPL-MCNC: 2.5 G/DL (ref 3.2–4.6)
ALBUMIN/GLOB SERPL: 0.6 {RATIO} (ref 1.2–3.5)
ALP SERPL-CCNC: 122 U/L (ref 50–136)
ALT SERPL-CCNC: 23 U/L (ref 12–65)
ANION GAP SERPL CALC-SCNC: 8 MMOL/L (ref 7–16)
AST SERPL-CCNC: 30 U/L (ref 15–37)
BASOPHILS # BLD: 0 K/UL (ref 0–0.2)
BASOPHILS NFR BLD: 0 % (ref 0–2)
BILIRUB SERPL-MCNC: 0.9 MG/DL (ref 0.2–1.1)
BUN SERPL-MCNC: 43 MG/DL (ref 8–23)
CALCIUM SERPL-MCNC: 9.5 MG/DL (ref 8.3–10.4)
CHLORIDE SERPL-SCNC: 102 MMOL/L (ref 98–107)
CO2 SERPL-SCNC: 30 MMOL/L (ref 21–32)
CREAT SERPL-MCNC: 1.61 MG/DL (ref 0.6–1)
DIFFERENTIAL METHOD BLD: ABNORMAL
EOSINOPHIL # BLD: 0 K/UL (ref 0–0.8)
EOSINOPHIL NFR BLD: 0 % (ref 0.5–7.8)
ERYTHROCYTE [DISTWIDTH] IN BLOOD BY AUTOMATED COUNT: 17.1 % (ref 11.9–14.6)
GLOBULIN SER CALC-MCNC: 4.1 G/DL (ref 2.3–3.5)
GLUCOSE SERPL-MCNC: 94 MG/DL (ref 65–100)
HCT VFR BLD AUTO: 30 % (ref 35.8–46.3)
HGB BLD-MCNC: 9.2 G/DL (ref 11.7–15.4)
IMM GRANULOCYTES # BLD: 0.1 K/UL (ref 0–0.5)
IMM GRANULOCYTES NFR BLD AUTO: 1 % (ref 0–5)
LACTATE BLD-SCNC: 1.2 MMOL/L (ref 0.5–1.9)
LIPASE SERPL-CCNC: 62 U/L (ref 73–393)
LYMPHOCYTES # BLD: 1.3 K/UL (ref 0.5–4.6)
LYMPHOCYTES NFR BLD: 5 % (ref 13–44)
MCH RBC QN AUTO: 24.5 PG (ref 26.1–32.9)
MCHC RBC AUTO-ENTMCNC: 30.7 G/DL (ref 31.4–35)
MCV RBC AUTO: 80 FL (ref 79.6–97.8)
MONOCYTES # BLD: 1.6 K/UL (ref 0.1–1.3)
MONOCYTES NFR BLD: 6 % (ref 4–12)
NEUTS SEG # BLD: 24.1 K/UL (ref 1.7–8.2)
NEUTS SEG NFR BLD: 88 % (ref 43–78)
PLATELET # BLD AUTO: 327 K/UL (ref 150–450)
PMV BLD AUTO: 10.3 FL (ref 10.8–14.1)
POTASSIUM SERPL-SCNC: 3.7 MMOL/L (ref 3.5–5.1)
PROT SERPL-MCNC: 6.6 G/DL (ref 6.3–8.2)
RBC # BLD AUTO: 3.75 M/UL (ref 4.05–5.25)
SODIUM SERPL-SCNC: 140 MMOL/L (ref 136–145)
WBC # BLD AUTO: 27.2 K/UL (ref 4.3–11.1)

## 2017-12-31 PROCEDURE — 76060000037 HC ANESTHESIA 3 TO 3.5 HR: Performed by: SURGERY

## 2017-12-31 PROCEDURE — 77030003029 HC SUT VCRL J&J -B: Performed by: SURGERY

## 2017-12-31 PROCEDURE — 0DBV0ZX EXCISION OF MESENTERY, OPEN APPROACH, DIAGNOSTIC: ICD-10-PCS | Performed by: SURGERY

## 2017-12-31 PROCEDURE — 74011250636 HC RX REV CODE- 250/636

## 2017-12-31 PROCEDURE — 99284 EMERGENCY DEPT VISIT MOD MDM: CPT | Performed by: EMERGENCY MEDICINE

## 2017-12-31 PROCEDURE — 74011250636 HC RX REV CODE- 250/636: Performed by: SURGERY

## 2017-12-31 PROCEDURE — 87040 BLOOD CULTURE FOR BACTERIA: CPT | Performed by: EMERGENCY MEDICINE

## 2017-12-31 PROCEDURE — 74011000258 HC RX REV CODE- 258: Performed by: EMERGENCY MEDICINE

## 2017-12-31 PROCEDURE — 83690 ASSAY OF LIPASE: CPT | Performed by: EMERGENCY MEDICINE

## 2017-12-31 PROCEDURE — 76010000171 HC OR TIME 2 TO 2.5 HR INTENSV-TIER 1: Performed by: SURGERY

## 2017-12-31 PROCEDURE — 74011250636 HC RX REV CODE- 250/636: Performed by: EMERGENCY MEDICINE

## 2017-12-31 PROCEDURE — 77030032490 HC SLV COMPR SCD KNE COVD -B: Performed by: SURGERY

## 2017-12-31 PROCEDURE — 85025 COMPLETE CBC W/AUTO DIFF WBC: CPT | Performed by: STUDENT IN AN ORGANIZED HEALTH CARE EDUCATION/TRAINING PROGRAM

## 2017-12-31 PROCEDURE — 77030012407 HC DRN WND BARD -B: Performed by: SURGERY

## 2017-12-31 PROCEDURE — 96361 HYDRATE IV INFUSION ADD-ON: CPT | Performed by: EMERGENCY MEDICINE

## 2017-12-31 PROCEDURE — 77030011640 HC PAD GRND REM COVD -A: Performed by: SURGERY

## 2017-12-31 PROCEDURE — 96365 THER/PROPH/DIAG IV INF INIT: CPT | Performed by: EMERGENCY MEDICINE

## 2017-12-31 PROCEDURE — 77030008477 HC STYL SATN SLP COVD -A: Performed by: ANESTHESIOLOGY

## 2017-12-31 PROCEDURE — 77030020782 HC GWN BAIR PAWS FLX 3M -B: Performed by: ANESTHESIOLOGY

## 2017-12-31 PROCEDURE — 77030002996 HC SUT SLK J&J -A: Performed by: SURGERY

## 2017-12-31 PROCEDURE — 77030011264 HC ELECTRD BLD EXT COVD -A: Performed by: SURGERY

## 2017-12-31 PROCEDURE — 77030019908 HC STETH ESOPH SIMS -A: Performed by: ANESTHESIOLOGY

## 2017-12-31 PROCEDURE — 83605 ASSAY OF LACTIC ACID: CPT

## 2017-12-31 PROCEDURE — 77030018521 HC STPLR ENDOSCOPIC J&J -C: Performed by: SURGERY

## 2017-12-31 PROCEDURE — 88305 TISSUE EXAM BY PATHOLOGIST: CPT | Performed by: SURGERY

## 2017-12-31 PROCEDURE — 0WQF0ZZ REPAIR ABDOMINAL WALL, OPEN APPROACH: ICD-10-PCS | Performed by: SURGERY

## 2017-12-31 PROCEDURE — 77030002966 HC SUT PDS J&J -A: Performed by: SURGERY

## 2017-12-31 PROCEDURE — 74011636320 HC RX REV CODE- 636/320: Performed by: EMERGENCY MEDICINE

## 2017-12-31 PROCEDURE — 65270000029 HC RM PRIVATE

## 2017-12-31 PROCEDURE — 77030010541: Performed by: SURGERY

## 2017-12-31 PROCEDURE — 80053 COMPREHEN METABOLIC PANEL: CPT | Performed by: STUDENT IN AN ORGANIZED HEALTH CARE EDUCATION/TRAINING PROGRAM

## 2017-12-31 PROCEDURE — 74011000258 HC RX REV CODE- 258: Performed by: SURGERY

## 2017-12-31 PROCEDURE — 74011000250 HC RX REV CODE- 250

## 2017-12-31 PROCEDURE — 0D1N0Z4 BYPASS SIGMOID COLON TO CUTANEOUS, OPEN APPROACH: ICD-10-PCS | Performed by: SURGERY

## 2017-12-31 PROCEDURE — 77030034850: Performed by: SURGERY

## 2017-12-31 PROCEDURE — 77030002986 HC SUT PROL J&J -A: Performed by: SURGERY

## 2017-12-31 PROCEDURE — 0JB80ZZ EXCISION OF ABDOMEN SUBCUTANEOUS TISSUE AND FASCIA, OPEN APPROACH: ICD-10-PCS | Performed by: SURGERY

## 2017-12-31 PROCEDURE — 77030013567 HC DRN WND RESERV BARD -A: Performed by: SURGERY

## 2017-12-31 PROCEDURE — 77030008703 HC TU ET UNCUF COVD -A: Performed by: ANESTHESIOLOGY

## 2017-12-31 PROCEDURE — 77030018836 HC SOL IRR NACL ICUM -A: Performed by: SURGERY

## 2017-12-31 PROCEDURE — 74177 CT ABD & PELVIS W/CONTRAST: CPT

## 2017-12-31 PROCEDURE — 88311 DECALCIFY TISSUE: CPT | Performed by: SURGERY

## 2017-12-31 PROCEDURE — 88307 TISSUE EXAM BY PATHOLOGIST: CPT | Performed by: SURGERY

## 2017-12-31 PROCEDURE — 77030002916 HC SUT ETHLN J&J -A: Performed by: SURGERY

## 2017-12-31 PROCEDURE — 74011250636 HC RX REV CODE- 250/636: Performed by: ANESTHESIOLOGY

## 2017-12-31 PROCEDURE — 0DBN0ZZ EXCISION OF SIGMOID COLON, OPEN APPROACH: ICD-10-PCS | Performed by: SURGERY

## 2017-12-31 PROCEDURE — 76210000016 HC OR PH I REC 1 TO 1.5 HR: Performed by: SURGERY

## 2017-12-31 PROCEDURE — 77030008467 HC STPLR SKN COVD -B: Performed by: SURGERY

## 2017-12-31 RX ORDER — SODIUM CHLORIDE, SODIUM LACTATE, POTASSIUM CHLORIDE, CALCIUM CHLORIDE 600; 310; 30; 20 MG/100ML; MG/100ML; MG/100ML; MG/100ML
INJECTION, SOLUTION INTRAVENOUS
Status: DISCONTINUED | OUTPATIENT
Start: 2017-12-31 | End: 2017-12-31 | Stop reason: HOSPADM

## 2017-12-31 RX ORDER — METOPROLOL TARTRATE 5 MG/5ML
INJECTION INTRAVENOUS AS NEEDED
Status: DISCONTINUED | OUTPATIENT
Start: 2017-12-31 | End: 2017-12-31 | Stop reason: HOSPADM

## 2017-12-31 RX ORDER — SODIUM CHLORIDE 0.9 % (FLUSH) 0.9 %
10 SYRINGE (ML) INJECTION
Status: COMPLETED | OUTPATIENT
Start: 2017-12-31 | End: 2017-12-31

## 2017-12-31 RX ORDER — ROCURONIUM BROMIDE 10 MG/ML
INJECTION, SOLUTION INTRAVENOUS AS NEEDED
Status: DISCONTINUED | OUTPATIENT
Start: 2017-12-31 | End: 2017-12-31 | Stop reason: HOSPADM

## 2017-12-31 RX ORDER — SODIUM CHLORIDE, SODIUM LACTATE, POTASSIUM CHLORIDE, CALCIUM CHLORIDE 600; 310; 30; 20 MG/100ML; MG/100ML; MG/100ML; MG/100ML
100 INJECTION, SOLUTION INTRAVENOUS CONTINUOUS
Status: DISCONTINUED | OUTPATIENT
Start: 2017-12-31 | End: 2017-12-31

## 2017-12-31 RX ORDER — NALOXONE HYDROCHLORIDE 0.4 MG/ML
0.2 INJECTION, SOLUTION INTRAMUSCULAR; INTRAVENOUS; SUBCUTANEOUS AS NEEDED
Status: DISCONTINUED | OUTPATIENT
Start: 2017-12-31 | End: 2017-12-31 | Stop reason: HOSPADM

## 2017-12-31 RX ORDER — ENOXAPARIN SODIUM 100 MG/ML
30 INJECTION SUBCUTANEOUS EVERY 24 HOURS
Status: DISCONTINUED | OUTPATIENT
Start: 2017-12-31 | End: 2018-01-07

## 2017-12-31 RX ORDER — SODIUM CHLORIDE 0.9 % (FLUSH) 0.9 %
5-10 SYRINGE (ML) INJECTION AS NEEDED
Status: DISCONTINUED | OUTPATIENT
Start: 2017-12-31 | End: 2017-12-31 | Stop reason: HOSPADM

## 2017-12-31 RX ORDER — LIDOCAINE HYDROCHLORIDE 20 MG/ML
INJECTION, SOLUTION EPIDURAL; INFILTRATION; INTRACAUDAL; PERINEURAL AS NEEDED
Status: DISCONTINUED | OUTPATIENT
Start: 2017-12-31 | End: 2017-12-31 | Stop reason: HOSPADM

## 2017-12-31 RX ORDER — OXYCODONE HYDROCHLORIDE 5 MG/1
5 TABLET ORAL
Status: DISCONTINUED | OUTPATIENT
Start: 2017-12-31 | End: 2017-12-31 | Stop reason: HOSPADM

## 2017-12-31 RX ORDER — OXYCODONE HYDROCHLORIDE 5 MG/1
10 TABLET ORAL
Status: DISCONTINUED | OUTPATIENT
Start: 2017-12-31 | End: 2017-12-31 | Stop reason: HOSPADM

## 2017-12-31 RX ORDER — SUCCINYLCHOLINE CHLORIDE 20 MG/ML
INJECTION INTRAMUSCULAR; INTRAVENOUS AS NEEDED
Status: DISCONTINUED | OUTPATIENT
Start: 2017-12-31 | End: 2017-12-31 | Stop reason: HOSPADM

## 2017-12-31 RX ORDER — FENTANYL CITRATE 50 UG/ML
INJECTION, SOLUTION INTRAMUSCULAR; INTRAVENOUS AS NEEDED
Status: DISCONTINUED | OUTPATIENT
Start: 2017-12-31 | End: 2017-12-31 | Stop reason: HOSPADM

## 2017-12-31 RX ORDER — ONDANSETRON 2 MG/ML
4 INJECTION INTRAMUSCULAR; INTRAVENOUS
Status: DISCONTINUED | OUTPATIENT
Start: 2017-12-31 | End: 2018-01-08 | Stop reason: HOSPADM

## 2017-12-31 RX ORDER — PROPOFOL 10 MG/ML
INJECTION, EMULSION INTRAVENOUS AS NEEDED
Status: DISCONTINUED | OUTPATIENT
Start: 2017-12-31 | End: 2017-12-31 | Stop reason: HOSPADM

## 2017-12-31 RX ORDER — DEXAMETHASONE SODIUM PHOSPHATE 4 MG/ML
INJECTION, SOLUTION INTRA-ARTICULAR; INTRALESIONAL; INTRAMUSCULAR; INTRAVENOUS; SOFT TISSUE AS NEEDED
Status: DISCONTINUED | OUTPATIENT
Start: 2017-12-31 | End: 2017-12-31 | Stop reason: HOSPADM

## 2017-12-31 RX ORDER — DIPHENHYDRAMINE HYDROCHLORIDE 50 MG/ML
12.5 INJECTION, SOLUTION INTRAMUSCULAR; INTRAVENOUS
Status: DISCONTINUED | OUTPATIENT
Start: 2017-12-31 | End: 2018-01-08 | Stop reason: HOSPADM

## 2017-12-31 RX ORDER — HYDROMORPHONE HYDROCHLORIDE 2 MG/ML
0.5 INJECTION, SOLUTION INTRAMUSCULAR; INTRAVENOUS; SUBCUTANEOUS
Status: DISCONTINUED | OUTPATIENT
Start: 2017-12-31 | End: 2017-12-31 | Stop reason: HOSPADM

## 2017-12-31 RX ORDER — SODIUM CHLORIDE 0.9 % (FLUSH) 0.9 %
5-10 SYRINGE (ML) INJECTION EVERY 8 HOURS
Status: DISCONTINUED | OUTPATIENT
Start: 2017-12-31 | End: 2018-01-08 | Stop reason: HOSPADM

## 2017-12-31 RX ORDER — HYDROMORPHONE HYDROCHLORIDE 2 MG/ML
.5-2 INJECTION, SOLUTION INTRAMUSCULAR; INTRAVENOUS; SUBCUTANEOUS
Status: DISCONTINUED | OUTPATIENT
Start: 2017-12-31 | End: 2018-01-04

## 2017-12-31 RX ORDER — SODIUM CHLORIDE, SODIUM LACTATE, POTASSIUM CHLORIDE, CALCIUM CHLORIDE 600; 310; 30; 20 MG/100ML; MG/100ML; MG/100ML; MG/100ML
100 INJECTION, SOLUTION INTRAVENOUS CONTINUOUS
Status: DISCONTINUED | OUTPATIENT
Start: 2017-12-31 | End: 2017-12-31 | Stop reason: HOSPADM

## 2017-12-31 RX ORDER — SODIUM CHLORIDE 0.9 % (FLUSH) 0.9 %
5-10 SYRINGE (ML) INJECTION AS NEEDED
Status: DISCONTINUED | OUTPATIENT
Start: 2017-12-31 | End: 2018-01-08 | Stop reason: HOSPADM

## 2017-12-31 RX ORDER — ONDANSETRON 2 MG/ML
INJECTION INTRAMUSCULAR; INTRAVENOUS AS NEEDED
Status: DISCONTINUED | OUTPATIENT
Start: 2017-12-31 | End: 2017-12-31 | Stop reason: HOSPADM

## 2017-12-31 RX ORDER — FLUMAZENIL 0.1 MG/ML
0.2 INJECTION INTRAVENOUS
Status: DISCONTINUED | OUTPATIENT
Start: 2017-12-31 | End: 2017-12-31 | Stop reason: HOSPADM

## 2017-12-31 RX ORDER — ACETAMINOPHEN 500 MG
1000 TABLET ORAL ONCE
Status: DISCONTINUED | OUTPATIENT
Start: 2017-12-31 | End: 2017-12-31 | Stop reason: HOSPADM

## 2017-12-31 RX ORDER — DIPHENHYDRAMINE HYDROCHLORIDE 50 MG/ML
12.5 INJECTION, SOLUTION INTRAMUSCULAR; INTRAVENOUS
Status: DISCONTINUED | OUTPATIENT
Start: 2017-12-31 | End: 2017-12-31 | Stop reason: HOSPADM

## 2017-12-31 RX ORDER — DEXTROSE, SODIUM CHLORIDE, AND POTASSIUM CHLORIDE 5; .45; .15 G/100ML; G/100ML; G/100ML
75 INJECTION INTRAVENOUS CONTINUOUS
Status: DISCONTINUED | OUTPATIENT
Start: 2017-12-31 | End: 2018-01-05

## 2017-12-31 RX ADMIN — FENTANYL CITRATE 50 MCG: 50 INJECTION, SOLUTION INTRAMUSCULAR; INTRAVENOUS at 13:07

## 2017-12-31 RX ADMIN — SODIUM CHLORIDE, SODIUM LACTATE, POTASSIUM CHLORIDE, AND CALCIUM CHLORIDE 100 ML/HR: 600; 310; 30; 20 INJECTION, SOLUTION INTRAVENOUS at 12:29

## 2017-12-31 RX ADMIN — FENTANYL CITRATE 50 MCG: 50 INJECTION, SOLUTION INTRAMUSCULAR; INTRAVENOUS at 12:46

## 2017-12-31 RX ADMIN — LIDOCAINE HYDROCHLORIDE 60 MG: 20 INJECTION, SOLUTION EPIDURAL; INFILTRATION; INTRACAUDAL; PERINEURAL at 12:46

## 2017-12-31 RX ADMIN — DIATRIZOATE MEGLUMINE AND DIATRIZOATE SODIUM 30 ML: 600; 100 SOLUTION ORAL; RECTAL at 08:19

## 2017-12-31 RX ADMIN — Medication 10 ML: at 09:52

## 2017-12-31 RX ADMIN — FENTANYL CITRATE 25 MCG: 50 INJECTION, SOLUTION INTRAMUSCULAR; INTRAVENOUS at 14:34

## 2017-12-31 RX ADMIN — PIPERACILLIN SODIUM,TAZOBACTAM SODIUM 3.38 G: 3; .375 INJECTION, POWDER, FOR SOLUTION INTRAVENOUS at 18:10

## 2017-12-31 RX ADMIN — SODIUM CHLORIDE 500 ML: 900 INJECTION, SOLUTION INTRAVENOUS at 10:10

## 2017-12-31 RX ADMIN — ROCURONIUM BROMIDE 5 MG: 10 INJECTION, SOLUTION INTRAVENOUS at 12:46

## 2017-12-31 RX ADMIN — ENOXAPARIN SODIUM 30 MG: 30 INJECTION SUBCUTANEOUS at 18:09

## 2017-12-31 RX ADMIN — PIPERACILLIN SODIUM,TAZOBACTAM SODIUM 4.5 G: 4; .5 INJECTION, POWDER, FOR SOLUTION INTRAVENOUS at 11:36

## 2017-12-31 RX ADMIN — METOPROLOL TARTRATE 2 MG: 5 INJECTION INTRAVENOUS at 14:44

## 2017-12-31 RX ADMIN — SODIUM CHLORIDE, SODIUM LACTATE, POTASSIUM CHLORIDE, CALCIUM CHLORIDE: 600; 310; 30; 20 INJECTION, SOLUTION INTRAVENOUS at 13:32

## 2017-12-31 RX ADMIN — FENTANYL CITRATE 50 MCG: 50 INJECTION, SOLUTION INTRAMUSCULAR; INTRAVENOUS at 13:22

## 2017-12-31 RX ADMIN — SODIUM CHLORIDE 100 ML: 900 INJECTION, SOLUTION INTRAVENOUS at 09:52

## 2017-12-31 RX ADMIN — ROCURONIUM BROMIDE 30 MG: 10 INJECTION, SOLUTION INTRAVENOUS at 12:59

## 2017-12-31 RX ADMIN — SUCCINYLCHOLINE CHLORIDE 100 MG: 20 INJECTION INTRAMUSCULAR; INTRAVENOUS at 12:46

## 2017-12-31 RX ADMIN — Medication 10 ML: at 21:48

## 2017-12-31 RX ADMIN — DEXAMETHASONE SODIUM PHOSPHATE 4 MG: 4 INJECTION, SOLUTION INTRA-ARTICULAR; INTRALESIONAL; INTRAMUSCULAR; INTRAVENOUS; SOFT TISSUE at 14:11

## 2017-12-31 RX ADMIN — ONDANSETRON 4 MG: 2 INJECTION INTRAMUSCULAR; INTRAVENOUS at 14:11

## 2017-12-31 RX ADMIN — DEXTROSE MONOHYDRATE, SODIUM CHLORIDE, AND POTASSIUM CHLORIDE 125 ML/HR: 50; 4.5; 1.49 INJECTION, SOLUTION INTRAVENOUS at 18:18

## 2017-12-31 RX ADMIN — SODIUM CHLORIDE, SODIUM LACTATE, POTASSIUM CHLORIDE, CALCIUM CHLORIDE: 600; 310; 30; 20 INJECTION, SOLUTION INTRAVENOUS at 13:00

## 2017-12-31 RX ADMIN — HYDROMORPHONE HYDROCHLORIDE 0.5 MG: 2 INJECTION, SOLUTION INTRAMUSCULAR; INTRAVENOUS; SUBCUTANEOUS at 15:25

## 2017-12-31 RX ADMIN — IOPAMIDOL 100 ML: 755 INJECTION, SOLUTION INTRAVENOUS at 09:52

## 2017-12-31 RX ADMIN — PROPOFOL 100 MG: 10 INJECTION, EMULSION INTRAVENOUS at 12:46

## 2017-12-31 RX ADMIN — Medication 10 ML: at 18:11

## 2017-12-31 NOTE — H&P
History and Physical    12/31/2017    Patient: Bassem Johansen 80 y.o. female     Referring Physician:  Meera Luna MD    Chief Complaint: hernia    History of Present Illness: Pt is a 80year old WF presenting to the ER earlier today with abdominal pain at the site of her long-standing umbilical hernia. This has been completely asymptomatic until last night, per patient. It has become acutely enlarged and tender prompting her SNF to bring her for evaluation. She denies any antecedent problems from the hernia. She denies fever, chills, nausea,vomiting, or recent alteration of bowel function. She has no prior abdominal surgical history    History:    Past Medical History:   Diagnosis Date    Compression fracture of lumbar spine, non-traumatic ([facility] [address] 2007    Depression 9/18/2012    DJD (degenerative joint disease) 9/18/2012    GERD (gastroesophageal reflux disease) 9/18/2012    HTN (hypertension) 9/18/2012    Hypertension     NATALIIA on CPAP 9/18/2012    Osteoporosis 9/18/2012    Other ill-defined conditions(799.89)     back injury in Oct    Psychiatric disorder     anixety     Past Surgical History:   Procedure Laterality Date    HX CATARACT REMOVAL      HX HEENT      tonsillectomy     Family History   Problem Relation Age of Onset    Stroke Mother      Social History     Social History    Marital status:      Spouse name: N/A    Number of children: N/A    Years of education: N/A     Occupational History    Not on file. Social History Main Topics    Smoking status: Never Smoker    Smokeless tobacco: Never Used    Alcohol use No    Drug use: No    Sexual activity: No     Other Topics Concern    Not on file     Social History Narrative    / 3 children       Allergies: No Known Allergies    Review of Systems:  A comprehensive review of systems was negative except for: Constitutional: positive for debility- recently moved to respite care, per son  Respiratory: positive for NATALIIA. on 2L NC at rest, uncertain indication  Gastrointestinal: positive for abdominal pain and per HPI  Musculoskeletal: positive for recent right wrist fracture, just out of cast    Prior to Admission Medications:    Prior to Admission Medications   Prescriptions Last Dose Informant Patient Reported? Taking? HYDROcodone-acetaminophen (NORCO) 7.5-325 mg per tablet   No No   Sig: Take 1 Tab by mouth every six (6) hours as needed for Pain. Max Daily Amount: 4 Tabs. Hydrochlorothiazide (HYDRODIURIL) 12.5 mg tablet   No No   Sig: Take 1 Tab by mouth daily. acetaminophen (TYLENOL EXTRA STRENGTH) 500 mg tablet   Yes No   Sig: Take  by mouth every six (6) hours as needed for Pain. lisinopril (PRINIVIL, ZESTRIL) 20 mg tablet   No No   Sig: TAKE 1 TABLET BY MOUTH DAILY      Facility-Administered Medications: None        Physical Exam:    Blood pressure 118/62, pulse 100, temperature 98 °F (36.7 °C), resp. rate 18, SpO2 97 %. Physical Exam:  GENERAL: alert, cooperative, no distress, appears stated age, EYE: negative findings: anicteric sclera, THROAT & NECK: mucous membranes dry and dental caries noted, LUNG: rhonchi L anterior, diminished breath sounds L base, HEART: regularly irregular rhythm, ABDOMEN: soft, non-tender. Bowel sounds normal. No masses,  no organomegaly.   Large, incarcerated umbilical hernia with visible fluid under skin, malodorous c/w ischemic bowel EXTREMITIES:  no edema, SKIN: no rash or abnormalities, NEUROLOGIC: negative findings: speech normal in context and clarity, PSYCHIATRIC: non focal    Labs:   Recent Results (from the past 24 hour(s))   LIPASE    Collection Time: 12/31/17  8:12 AM   Result Value Ref Range    Lipase 62 (L) 73 - 393 U/L   CBC WITH AUTOMATED DIFF    Collection Time: 12/31/17  8:13 AM   Result Value Ref Range    WBC 27.2 (H) 4.3 - 11.1 K/uL    RBC 3.75 (L) 4.05 - 5.25 M/uL    HGB 9.2 (L) 11.7 - 15.4 g/dL    HCT 30.0 (L) 35.8 - 46.3 %    MCV 80.0 79.6 - 97.8 FL    MCH 24.5 (L) 26.1 - 32.9 PG    MCHC 30.7 (L) 31.4 - 35.0 g/dL    RDW 17.1 (H) 11.9 - 14.6 %    PLATELET 076 796 - 185 K/uL    MPV 10.3 (L) 10.8 - 14.1 FL    DF AUTOMATED      NEUTROPHILS 88 (H) 43 - 78 %    LYMPHOCYTES 5 (L) 13 - 44 %    MONOCYTES 6 4.0 - 12.0 %    EOSINOPHILS 0 (L) 0.5 - 7.8 %    BASOPHILS 0 0.0 - 2.0 %    IMMATURE GRANULOCYTES 1 0.0 - 5.0 %    ABS. NEUTROPHILS 24.1 (H) 1.7 - 8.2 K/UL    ABS. LYMPHOCYTES 1.3 0.5 - 4.6 K/UL    ABS. MONOCYTES 1.6 (H) 0.1 - 1.3 K/UL    ABS. EOSINOPHILS 0.0 0.0 - 0.8 K/UL    ABS. BASOPHILS 0.0 0.0 - 0.2 K/UL    ABS. IMM. GRANS. 0.1 0.0 - 0.5 K/UL   METABOLIC PANEL, COMPREHENSIVE    Collection Time: 12/31/17  8:13 AM   Result Value Ref Range    Sodium 140 136 - 145 mmol/L    Potassium 3.7 3.5 - 5.1 mmol/L    Chloride 102 98 - 107 mmol/L    CO2 30 21 - 32 mmol/L    Anion gap 8 7 - 16 mmol/L    Glucose 94 65 - 100 mg/dL    BUN 43 (H) 8 - 23 MG/DL    Creatinine 1.61 (H) 0.6 - 1.0 MG/DL    GFR est AA 38 (L) >60 ml/min/1.73m2    GFR est non-AA 32 (L) >60 ml/min/1.73m2    Calcium 9.5 8.3 - 10.4 MG/DL    Bilirubin, total 0.9 0.2 - 1.1 MG/DL    ALT (SGPT) 23 12 - 65 U/L    AST (SGOT) 30 15 - 37 U/L    Alk.  phosphatase 122 50 - 136 U/L    Protein, total 6.6 6.3 - 8.2 g/dL    Albumin 2.5 (L) 3.2 - 4.6 g/dL    Globulin 4.1 (H) 2.3 - 3.5 g/dL    A-G Ratio 0.6 (L) 1.2 - 3.5     CULTURE, BLOOD    Collection Time: 12/31/17  9:06 AM   Result Value Ref Range    Special Requests: RIGHT  Antecubital        Culture result: PENDING    CULTURE, BLOOD    Collection Time: 12/31/17  9:07 AM   Result Value Ref Range    Special Requests: LEFT  Antecubital        Culture result: PENDING    POC LACTIC ACID    Collection Time: 12/31/17  9:09 AM   Result Value Ref Range    Lactic Acid (POC) 1.2 0.5 - 1.9 mmol/L       Data Review reviewed  CT    Assessment:     Principal Problem:    Incarcerated umbilical hernia (01/42/4154)    Active Problems:    Essential hypertension (9/18/2012)      NATALIIA on CPAP (9/18/2012) CRI (chronic renal insufficiency) (1/21/2013)        Plan/Recommendations/Medical Decision Making: Will take urgently to OR for repair. This will likely require resection of presumably necrotic/ischemic loop of sigmoid which will result in a colostomy. At her age and underlying debility, this will likely be a permanent colostomy. Pt/family understand. Mesh placement for hernia repair also likely not ideal, rendering recurrence of the hernia more likely. Surgical risks include risks of anesthesia, bleeding, visceral injury, infection.        Prachi Ventura MD  12/31/2017

## 2017-12-31 NOTE — ED PROVIDER NOTES
HPI Comments: Patient is a  81 yo female with abdominal pain and mass. States she has had a mass there for a long time however worsening and more painful and now draining. No fevers or chills, no nausea or vomiting, no further complaints    Patient is a 80 y.o. female presenting with other event. The history is provided by the patient. No  was used. Other   Associated symptoms include abdominal pain. Pertinent negatives include no chest pain, no headaches and no shortness of breath. Past Medical History:   Diagnosis Date    Compression fracture of lumbar spine, non-traumatic ([facility] [address] 2007    Depression 9/18/2012    DJD (degenerative joint disease) 9/18/2012    GERD (gastroesophageal reflux disease) 9/18/2012    HTN (hypertension) 9/18/2012    Hypertension     NATALIIA on CPAP 9/18/2012    Osteoporosis 9/18/2012    Other ill-defined conditions(799.89)     back injury in Oct    Psychiatric disorder     anixety       Past Surgical History:   Procedure Laterality Date    HX CATARACT REMOVAL      HX HEENT      tonsillectomy         Family History:   Problem Relation Age of Onset    Stroke Mother        Social History     Social History    Marital status:      Spouse name: N/A    Number of children: N/A    Years of education: N/A     Occupational History    Not on file. Social History Main Topics    Smoking status: Never Smoker    Smokeless tobacco: Never Used    Alcohol use No    Drug use: No    Sexual activity: No     Other Topics Concern    Not on file     Social History Narrative    / 3 children         ALLERGIES: Review of patient's allergies indicates no known allergies. Review of Systems   Constitutional: Negative for chills and fever. HENT: Negative for rhinorrhea and sore throat. Eyes: Negative for visual disturbance. Respiratory: Negative for cough and shortness of breath. Cardiovascular: Negative for chest pain and leg swelling. Gastrointestinal: Positive for abdominal pain. Negative for diarrhea, nausea and vomiting. Genitourinary: Negative for dysuria. Musculoskeletal: Negative for back pain and neck pain. Skin: Negative for rash. Neurological: Negative for weakness and headaches. Psychiatric/Behavioral: The patient is not nervous/anxious. Vitals:    12/31/17 0809   BP: 136/67   Pulse: 100   Resp: 18   Temp: 98 °F (36.7 °C)   SpO2: 97%            Physical Exam   Constitutional: She is oriented to person, place, and time. She appears well-developed and well-nourished. HENT:   Head: Normocephalic. Right Ear: External ear normal.   Left Ear: External ear normal.   Eyes: Conjunctivae and EOM are normal. Pupils are equal, round, and reactive to light. Neck: Normal range of motion. Neck supple. No tracheal deviation present. Cardiovascular: Normal rate, regular rhythm, normal heart sounds and intact distal pulses. No murmur heard. Pulmonary/Chest: Effort normal and breath sounds normal. No respiratory distress. Abdominal: Soft. She exhibits mass. There is tenderness. Large mid-line mass around umbilicus with oozing. Non-reducible. Musculoskeletal: Normal range of motion. Neurological: She is alert and oriented to person, place, and time. No cranial nerve deficit. Skin: No rash noted. Nursing note and vitals reviewed.        MDM  Number of Diagnoses or Management Options     Amount and/or Complexity of Data Reviewed  Clinical lab tests: ordered and reviewed  Tests in the radiology section of CPT®: ordered and reviewed  Review and summarize past medical records: yes    Risk of Complications, Morbidity, and/or Mortality  Presenting problems: high  Diagnostic procedures: high  Management options: high    Patient Progress  Patient progress: stable    ED Course       Procedures     Recent Results (from the past 12 hour(s))   LIPASE    Collection Time: 12/31/17  8:12 AM   Result Value Ref Range    Lipase 62 (L) 73 - 393 U/L   CBC WITH AUTOMATED DIFF    Collection Time: 12/31/17  8:13 AM   Result Value Ref Range    WBC 27.2 (H) 4.3 - 11.1 K/uL    RBC 3.75 (L) 4.05 - 5.25 M/uL    HGB 9.2 (L) 11.7 - 15.4 g/dL    HCT 30.0 (L) 35.8 - 46.3 %    MCV 80.0 79.6 - 97.8 FL    MCH 24.5 (L) 26.1 - 32.9 PG    MCHC 30.7 (L) 31.4 - 35.0 g/dL    RDW 17.1 (H) 11.9 - 14.6 %    PLATELET 812 829 - 749 K/uL    MPV 10.3 (L) 10.8 - 14.1 FL    DF AUTOMATED      NEUTROPHILS 88 (H) 43 - 78 %    LYMPHOCYTES 5 (L) 13 - 44 %    MONOCYTES 6 4.0 - 12.0 %    EOSINOPHILS 0 (L) 0.5 - 7.8 %    BASOPHILS 0 0.0 - 2.0 %    IMMATURE GRANULOCYTES 1 0.0 - 5.0 %    ABS. NEUTROPHILS 24.1 (H) 1.7 - 8.2 K/UL    ABS. LYMPHOCYTES 1.3 0.5 - 4.6 K/UL    ABS. MONOCYTES 1.6 (H) 0.1 - 1.3 K/UL    ABS. EOSINOPHILS 0.0 0.0 - 0.8 K/UL    ABS. BASOPHILS 0.0 0.0 - 0.2 K/UL    ABS. IMM. GRANS. 0.1 0.0 - 0.5 K/UL   METABOLIC PANEL, COMPREHENSIVE    Collection Time: 12/31/17  8:13 AM   Result Value Ref Range    Sodium 140 136 - 145 mmol/L    Potassium 3.7 3.5 - 5.1 mmol/L    Chloride 102 98 - 107 mmol/L    CO2 30 21 - 32 mmol/L    Anion gap 8 7 - 16 mmol/L    Glucose 94 65 - 100 mg/dL    BUN 43 (H) 8 - 23 MG/DL    Creatinine 1.61 (H) 0.6 - 1.0 MG/DL    GFR est AA 38 (L) >60 ml/min/1.73m2    GFR est non-AA 32 (L) >60 ml/min/1.73m2    Calcium 9.5 8.3 - 10.4 MG/DL    Bilirubin, total 0.9 0.2 - 1.1 MG/DL    ALT (SGPT) 23 12 - 65 U/L    AST (SGOT) 30 15 - 37 U/L    Alk.  phosphatase 122 50 - 136 U/L    Protein, total 6.6 6.3 - 8.2 g/dL    Albumin 2.5 (L) 3.2 - 4.6 g/dL    Globulin 4.1 (H) 2.3 - 3.5 g/dL    A-G Ratio 0.6 (L) 1.2 - 3.5     CULTURE, BLOOD    Collection Time: 12/31/17  9:06 AM   Result Value Ref Range    Special Requests: RIGHT  Antecubital        Culture result: PENDING    CULTURE, BLOOD    Collection Time: 12/31/17  9:07 AM   Result Value Ref Range    Special Requests: LEFT  Antecubital        Culture result: PENDING    POC LACTIC ACID    Collection Time: 12/31/17  9:09 AM   Result Value Ref Range    Lactic Acid (POC) 1.2 0.5 - 1.9 mmol/L     Ct Abd Pelv W Cont    Result Date: 12/31/2017  CT abdomen and pelvis with contrast: 12/31/2017 History: Lower abdominal pain beginning last night. Swelling and bleeding at umbilicus Technique: Helically acquired images were obtained from the domes of the diaphragms to the ischial tuberosities reconstructed at 5mm intervals after the uneventful administration of oral contrast and 100 cc's of intravenous Isovue-370 in order to better evaluate the solid and hollow abdominal viscera. Coronal reformatted images were submitted. Radiation dose reduction techniques were used for this study:  Our CT scanners use one or all of the following: Automated exposure control, adjustment of the mA and/or kVp according to patient's size, iterative reconstruction. Comparison: 08/18/2008 CT ABDOMEN: There is a large hiatal hernia. The liver, spleen, pancreas and adrenal glands are unremarkable. There are several right renal cysts measuring up to 5.6 cm at the lower pole. The kidneys are otherwise unremarkable. Atherosclerotic changes are present involving the abdominal aorta. There is a large umbilical hernia containing a portion of the descending colon. There are extraluminal foci of gas within the hernia sac as well as just beneath the anterior abdominal wall near the defect. There is associated soft tissue stranding. There is suspicious findings for pneumatosis involving the colon within the hernia sac. There is no bowel obstruction. No adenopathy or ascites is present. There is a peripherally calcified structure within the left lower quadrant measuring 2.4 cm without significant change. This could represent an area of remote omental infarct. CT PELVIS: There is a moderate fecal impaction. No adenopathy or ascites is present. There are no inflammatory changes. There are compression fractures at L1, T10. T8 and T7.  The thoracic compression fractures appear mildly sclerotic suggesting that they may be subacute in age. IMPRESSION: 1. Large umbilical hernia containing a portion of descending colon. There is free air and findings suspicious for pneumatosis involving the herniated bowel. The imaging features would be most suspicious for strangulation and perforation. 2. Moderate fecal impaction. 3. Large hiatal hernia. [facility] The critical results contained in this report were communicated to Dr. Ruslan Tellez by myself on 12/31/2017 at 10:12 AM. Critical results were communicated as outlined in Section II.C.2.a.i of the ACR Practice Guideline for Communication of Diagnostic Imaging Findings. 81 yo female with abdominal mass:     Surgery consulted immediately for admission and likely surgical repair.

## 2017-12-31 NOTE — PERIOP NOTES
TRANSFER - OUT REPORT:    Verbal report given to [facility]  on Hay Nicholson  being transferred to room 207 for routine post - op       Report consisted of patients Situation, Background, Assessment and   Recommendations(SBAR). Information from the following report(s) SBAR, Kardex, OR Summary, MAR, Med Rec Status and Cardiac Rhythm SR was reviewed with the receiving nurse. Lines:   Peripheral IV 12/31/17 Left Forearm (Active)   Site Assessment Clean, dry, & intact 12/31/2017  3:39 PM   Phlebitis Assessment 0 12/31/2017  3:39 PM   Infiltration Assessment 0 12/31/2017  3:39 PM   Dressing Status Clean, dry, & intact 12/31/2017  3:39 PM   Dressing Type Transparent;Tape 12/31/2017  3:39 PM   Hub Color/Line Status Green 12/31/2017  3:39 PM       Peripheral IV 12/31/17 Right Antecubital (Active)   Site Assessment Clean, dry, & intact 12/31/2017  3:39 PM   Phlebitis Assessment 0 12/31/2017  3:39 PM   Infiltration Assessment 0 12/31/2017  2:57 PM   Dressing Status Clean, dry, & intact 12/31/2017  3:39 PM   Dressing Type Transparent;Tape 12/31/2017  3:39 PM   Hub Color/Line Status Green 12/31/2017  3:39 PM        Opportunity for questions and clarification was provided. Patient transported with:   O2 @ 3L orally via NC liters    VTE prophylaxis orders have been written for Hay Nicholson. Patient and family given floor number and nurses name. Family updated re: pt status after security code verified.

## 2017-12-31 NOTE — ED TRIAGE NOTES
Pt arrives to the ER with bleeding and swelling around navel. Pt unsure of how it happened, records with pt do not state any past injury trauma to area. Pt is confused at all times per EMS. Pt wear 2L of oxygen at home. Pt has no complaints at this time, denies any pain. Pt complained of lower abd pain last night, per EMS facility ([facility]) states it turned black and blue last night. Area is now swollen and bleeding.

## 2017-12-31 NOTE — OP NOTES
Operative Report    Patient: Migel Kaur MRN: [id number]     YOB: 1923  Age: 80 y.o. Sex: female       Date of Surgery: 12/31/2017     Preoperative Diagnosis: Incarcerated umbilical hernia     Postoperative Diagnosis: Incarcerated umbilical hernia, ISCHEMIC SIGMOID, CELLULITIS OF ABDOMINAL WALL,      NAME OF PROCEDURE:  Procedure(s):  LAPAROTOMY EXPLORATORY REDUCTION AND REPAIR OF INCARERATED UMBILICAL HERNIA, SIGMOIDCOLECTOMY AND COLOSTOMY, BIOPSY OF MESENTERIC NODULES, DEBRIDEMENT OF SKIN, SUBCUTANEOUS, FASCIA  ABDOMINAL WALL. SURGEON: Reginald Puentes MD    Anesthesia: General     Complications: none      Procedure Details       Informed consent was obtained and the patient was brought to the operating room and placed supine. After induction of a general anesthetic, a Jurado catheter was inserted and the abdomen was prepped and draped in standard fashion. A supraumbilical midline 4cm incision was made and cautery was used to dissect  into the peritoneal cavity. Exploration revealed no immediately apparent pathology. A finger was inserted alongside the incarcerated sigmoid colon in the umbilical hernia and cautery was then used to cut down the fascia and the overlying umbilical skin to open the hernia into the incision. This revealed frankly necrotic colon and hernia sac as well as apparent necrosis of subcutaneous tissue. Several appendices epiploica were adherent to the sac and were freed. Mobilization of the sigmoid was done manually, allowing the sharp transition of necrosis at the fascial level to be visualized. No formal mobilization was required to gain adequate length both proximally and distally to resect the necrotic segment. This was done with a NANETTE stapler about 1cm above and below the necrosis. The distal stump was tagged with a prolene suture and returned to the pelvis.   The mesentery was divided between hemostats near the colon wall, and was controlled with silk ties to complete the specimen removal.  There was minimal blood loss during this portion of the procedure. Inspection of the end of the sigmoid colon revealed several calcific nodules in the fat, measuring from 4-5mm up to 15mm. Two of these nodules were excised with cautery and submitted to pathology; physical appearance suggests fat necrosis. The fascia around the hernia showed a few areas of focal necrosis suspicious for early fasciitis. These portions were excised with cautery. All visibly necrotic tissue in the subcutaneous space was excised with cautery which included the hernia sac but also at least a 1cm (average) zone of abnormal appearing adipose around it. The umbilical skin itself was necrotic, and redundant skin including the umbilicus itself was excised to aid in closure and to avoid future skin necrosis from loss of subcutaneous support. This area was then vigorously irrigated and made hemostatic. A site in the LLQ was chosen for colostomy placement. The mesentery was incised at the level of the colonic transection in an avascular plane to aid in mobility. At the chosen site a button of skin and underlying subcutaneous tissue was excised with cautery. A cruciate fascial incision was made and the colon passed easily through. The abdomen was inspected. The fascia was then closed, to include the hernia, with a series of interrupted sutures of 0-prolene in figure-of-eight fashion. A drain was placed in the subcutaneous space. The subcutaneous tissue was approximated over the drain with running 3-0 vicryl. The skin was closed at wide intervals with staples. The drain was sutured in place with a 3-0 nylon. The colostomy was then matured in a typical thelma style with interrupted 3-0 vicryl sutures. An appliance was applied.       The patient tolerated the procedure well with no apparent complications and was awakened from anesthesia and extubated in the operating room and taken to the recovery room in satisfactory condition. Sponge and needle counts were correct times two as reported to me.     Estimated Blood Loss: per anesthesia           Specimens:   ID Type Source Tests Collected by Time Destination   1 : MESENTERIC NODULES Fresh Abdomen  Alejandrina Ley MD 12/31/2017 1317 Pathology   2 : SIGMOID COLON Fresh Sigmoid  Alejandrina Ley MD 12/31/2017 1322 Pathology           Signed By:  Alejandrina Ley MD     December 31, 2017

## 2017-12-31 NOTE — ANESTHESIA POSTPROCEDURE EVALUATION
Post-Anesthesia Evaluation and Assessment    Patient: Claire Duran MRN: [id number]  SSN: [id number]    YOB: 1923  Age: 80 y.o. Sex: female       Cardiovascular Function/Vital Signs  Visit Vitals    /60    Pulse 90    Temp 37.2 °C (99 °F)    Resp 22    Ht 5' 10\" (1.778 m)    Wt 92.5 kg (204 lb)    SpO2 94%    BMI 29.27 kg/m2       Patient is status post general anesthesia for Procedure(s):  LAPAROTOMY EXPLORATORY REDUCTION AND REPAIR OF INCARERATED UMBILICAL HERNIA, SIGMOIDCOLECTOMY AND COLOSTOMY, BIOPSY OF MESENTERIC NODULES, DEBRIDEMENT OF SKIN, SUBCUTANEOUS, FASCIA  ABDOMINAL WALL. Nausea/Vomiting: None    Postoperative hydration reviewed and adequate. Pain:  Pain Scale 1: Visual (12/31/17 1539)  Pain Intensity 1: 3 (12/31/17 1539)   Managed    Neurological Status:   Neuro (WDL): Within Defined Limits (12/31/17 1539)   At baseline    Mental Status and Level of Consciousness: Arousable    Pulmonary Status:   O2 Device: Nasal cannula (12/31/17 1548)   Adequate oxygenation and airway patent    Complications related to anesthesia: None    Post-anesthesia assessment completed. Doing well post op. On 3L NC.     Signed By: Lexi Moody MD     December 31, 2017

## 2018-01-01 LAB
ANION GAP SERPL CALC-SCNC: 6 MMOL/L (ref 7–16)
BUN SERPL-MCNC: 38 MG/DL (ref 8–23)
CALCIUM SERPL-MCNC: 8.6 MG/DL (ref 8.3–10.4)
CHLORIDE SERPL-SCNC: 107 MMOL/L (ref 98–107)
CO2 SERPL-SCNC: 30 MMOL/L (ref 21–32)
CREAT SERPL-MCNC: 1.19 MG/DL (ref 0.6–1)
ERYTHROCYTE [DISTWIDTH] IN BLOOD BY AUTOMATED COUNT: 17.3 % (ref 11.9–14.6)
GLUCOSE SERPL-MCNC: 143 MG/DL (ref 65–100)
HCT VFR BLD AUTO: 28.5 % (ref 35.8–46.3)
HGB BLD-MCNC: 8.4 G/DL (ref 11.7–15.4)
MCH RBC QN AUTO: 24.1 PG (ref 26.1–32.9)
MCHC RBC AUTO-ENTMCNC: 29.5 G/DL (ref 31.4–35)
MCV RBC AUTO: 81.9 FL (ref 79.6–97.8)
PLATELET # BLD AUTO: 279 K/UL (ref 150–450)
PMV BLD AUTO: 10.1 FL (ref 10.8–14.1)
POTASSIUM SERPL-SCNC: 4.6 MMOL/L (ref 3.5–5.1)
RBC # BLD AUTO: 3.48 M/UL (ref 4.05–5.25)
SODIUM SERPL-SCNC: 143 MMOL/L (ref 136–145)
WBC # BLD AUTO: 13.6 K/UL (ref 4.3–11.1)

## 2018-01-01 PROCEDURE — 74011250636 HC RX REV CODE- 250/636: Performed by: SURGERY

## 2018-01-01 PROCEDURE — 74011250637 HC RX REV CODE- 250/637: Performed by: SURGERY

## 2018-01-01 PROCEDURE — 36415 COLL VENOUS BLD VENIPUNCTURE: CPT | Performed by: SURGERY

## 2018-01-01 PROCEDURE — 80048 BASIC METABOLIC PNL TOTAL CA: CPT | Performed by: SURGERY

## 2018-01-01 PROCEDURE — 74011000258 HC RX REV CODE- 258: Performed by: SURGERY

## 2018-01-01 PROCEDURE — 65270000029 HC RM PRIVATE

## 2018-01-01 PROCEDURE — 85027 COMPLETE CBC AUTOMATED: CPT | Performed by: SURGERY

## 2018-01-01 RX ADMIN — PIPERACILLIN SODIUM,TAZOBACTAM SODIUM 3.38 G: 3; .375 INJECTION, POWDER, FOR SOLUTION INTRAVENOUS at 18:10

## 2018-01-01 RX ADMIN — HYDROMORPHONE HYDROCHLORIDE 0.5 MG: 2 INJECTION, SOLUTION INTRAMUSCULAR; INTRAVENOUS; SUBCUTANEOUS at 02:38

## 2018-01-01 RX ADMIN — Medication: at 21:28

## 2018-01-01 RX ADMIN — PIPERACILLIN SODIUM,TAZOBACTAM SODIUM 3.38 G: 3; .375 INJECTION, POWDER, FOR SOLUTION INTRAVENOUS at 09:59

## 2018-01-01 RX ADMIN — Medication 10 ML: at 05:14

## 2018-01-01 RX ADMIN — PIPERACILLIN SODIUM,TAZOBACTAM SODIUM 3.38 G: 3; .375 INJECTION, POWDER, FOR SOLUTION INTRAVENOUS at 02:13

## 2018-01-01 RX ADMIN — ENOXAPARIN SODIUM 30 MG: 30 INJECTION SUBCUTANEOUS at 18:10

## 2018-01-01 RX ADMIN — DEXTROSE MONOHYDRATE, SODIUM CHLORIDE, AND POTASSIUM CHLORIDE 125 ML/HR: 50; 4.5; 1.49 INJECTION, SOLUTION INTRAVENOUS at 01:52

## 2018-01-01 NOTE — PROGRESS NOTES
END OF SHIFT NOTE:    INTAKE/OUTPUT     Voiding: NO  Catheter: YES  Drain:   Risa Pal #1 12/31/17 Anterior Abdomen (Active)   Site Assessment Clean, dry, & intact 12/31/2017  5:40 PM   Dressing Status Clean, dry, & intact 12/31/2017  5:40 PM   Drainage Description Serosanguinous 12/31/2017  5:40 PM   Rob Drain Airleak No 12/31/2017  5:40 PM   Status Patent; Charged;Draining 12/31/2017  5:40 PM   Output (ml) 20 ml 12/31/2017  7:33 PM               Flatus: Patient does not have flatus present. Stool:  0 occurrences. Characteristics:       Emesis: 0 occurrences. Characteristics:        VITAL SIGNS  Patient Vitals for the past 12 hrs:   Temp Pulse Resp BP SpO2   12/31/17 1712 98.9 °F (37.2 °C) 99 24 117/64 94 %   12/31/17 1548 - 90 - 143/60 94 %   12/31/17 1543 - 90 - 134/64 93 %   12/31/17 1539 99 °F (37.2 °C) 92 22 138/64 90 %   12/31/17 1534 - 94 22 147/67 (!) 88 %   12/31/17 1529 - 93 22 147/69 98 %   12/31/17 1525 - - 24 - -   12/31/17 1524 - 91 24 146/66 98 %   12/31/17 1519 - 90 24 149/67 100 %   12/31/17 1514 - 93 22 120/53 95 %   12/31/17 1509 - 93 22 156/70 (!) 82 %   12/31/17 1504 - 91 22 147/61 98 %   12/31/17 1459 - 91 22 166/70 98 %   12/31/17 1457 99.8 °F (37.7 °C) 92 22 157/68 98 %   12/31/17 1454 - 91 - 157/68 98 %   12/31/17 1216 97.6 °F (36.4 °C) 95 18 137/64 95 %   12/31/17 1133 - 99 - - 92 %   12/31/17 1131 - - - 159/66 -   12/31/17 0832 - 100 - 118/62 97 %       Pain Assessment  Pain Intensity 1: 0 (12/31/17 1740)  Pain Location 1: Abdomen  Pain Intervention(s) 1: Medication (see MAR)       Ambulating  No    Shift report given to oncoming nurse at the bedside.     Jasson Stephenson RN

## 2018-01-01 NOTE — ROUTINE PROCESS
END OF SHIFT NOTE:    INTAKE/OUTPUT  12/31 0701 - 01/01 0700  In: 7378 [I.V.:2941]  Out: 0013 [Urine:1000; Drains:30]  Voiding: YES  Catheter: YES  Drain:   Sarahy Espitia #1 12/31/17 Anterior Abdomen (Active)   Site Assessment Clean, dry, & intact 1/1/2018 12:15 AM   Dressing Status Clean, dry, & intact 1/1/2018 12:15 AM   Drainage Description Serosanguinous 1/1/2018 12:15 AM   Rob Drain Airleak No 1/1/2018 12:15 AM   Status Patent; Charged;Draining;Suction (specify 1/1/2018 12:15 AM   Y Connector Used No 1/1/2018 12:15 AM   Output (ml) 10 ml 1/1/2018  3:38 AM               Flatus: Patient does not have flatus present. Stool:  0 occurrences. Characteristics:       Emesis: 0 occurrences. Characteristics:        VITAL SIGNS  Patient Vitals for the past 12 hrs:   Temp Pulse Resp BP SpO2   01/01/18 0400 97.8 °F (36.6 °C) 81 20 101/60 94 %   12/31/17 2345 97.8 °F (36.6 °C) 85 20 120/72 94 %   12/31/17 2015 98 °F (36.7 °C) 82 20 129/72 96 %       Pain Assessment  Pain Intensity 1: 0 (01/01/18 0338)  Pain Location 1: Abdomen  Pain Intervention(s) 1: Medication (see MAR)  Patient Stated Pain Goal: 0    Ambulating  No    Shift report given to oncoming nurse at the bedside.     Geovanny Grande LPN

## 2018-01-01 NOTE — PROGRESS NOTES
Problem: Falls - Risk of  Goal: *Absence of Falls  Document Bessy Fall Risk and appropriate interventions in the flowsheet.   Outcome: Progressing Towards Goal  Fall Risk Interventions:  Mobility Interventions: Communicate number of staff needed for ambulation/transfer, Patient to call before getting OOB, PT Consult for mobility concerns    Mentation Interventions: Adequate sleep, hydration, pain control, Door open when patient unattended, Room close to nurse's station    Medication Interventions: Patient to call before getting OOB, Teach patient to arise slowly    Elimination Interventions: Call light in reach, Patient to call for help with toileting needs, Toilet paper/wipes in reach    History of Falls Interventions: Consult care management for discharge planning, Door open when patient unattended, Room close to nurse's station

## 2018-01-02 PROCEDURE — 74011250637 HC RX REV CODE- 250/637: Performed by: SURGERY

## 2018-01-02 PROCEDURE — 74011000250 HC RX REV CODE- 250: Performed by: PHYSICIAN ASSISTANT

## 2018-01-02 PROCEDURE — 74011000302 HC RX REV CODE- 302: Performed by: SURGERY

## 2018-01-02 PROCEDURE — 86580 TB INTRADERMAL TEST: CPT | Performed by: SURGERY

## 2018-01-02 PROCEDURE — C9113 INJ PANTOPRAZOLE SODIUM, VIA: HCPCS | Performed by: PHYSICIAN ASSISTANT

## 2018-01-02 PROCEDURE — 74011250636 HC RX REV CODE- 250/636: Performed by: SURGERY

## 2018-01-02 PROCEDURE — 74011250636 HC RX REV CODE- 250/636: Performed by: PHYSICIAN ASSISTANT

## 2018-01-02 PROCEDURE — 65270000029 HC RM PRIVATE

## 2018-01-02 PROCEDURE — 74011000258 HC RX REV CODE- 258: Performed by: SURGERY

## 2018-01-02 PROCEDURE — 97163 PT EVAL HIGH COMPLEX 45 MIN: CPT

## 2018-01-02 RX ADMIN — Medication 1 AMPULE: at 20:54

## 2018-01-02 RX ADMIN — DEXTROSE MONOHYDRATE, SODIUM CHLORIDE, AND POTASSIUM CHLORIDE 125 ML/HR: 50; 4.5; 1.49 INJECTION, SOLUTION INTRAVENOUS at 09:42

## 2018-01-02 RX ADMIN — TUBERCULIN PURIFIED PROTEIN DERIVATIVE 5 UNITS: 5 INJECTION INTRADERMAL at 17:41

## 2018-01-02 RX ADMIN — PIPERACILLIN SODIUM,TAZOBACTAM SODIUM 3.38 G: 3; .375 INJECTION, POWDER, FOR SOLUTION INTRAVENOUS at 06:00

## 2018-01-02 RX ADMIN — ENOXAPARIN SODIUM 30 MG: 30 INJECTION SUBCUTANEOUS at 17:40

## 2018-01-02 RX ADMIN — PIPERACILLIN SODIUM,TAZOBACTAM SODIUM 3.38 G: 3; .375 INJECTION, POWDER, FOR SOLUTION INTRAVENOUS at 17:40

## 2018-01-02 RX ADMIN — HYDROMORPHONE HYDROCHLORIDE 0.5 MG: 2 INJECTION, SOLUTION INTRAMUSCULAR; INTRAVENOUS; SUBCUTANEOUS at 03:12

## 2018-01-02 RX ADMIN — SODIUM CHLORIDE 40 MG: 9 INJECTION INTRAMUSCULAR; INTRAVENOUS; SUBCUTANEOUS at 09:37

## 2018-01-02 RX ADMIN — DEXTROSE MONOHYDRATE, SODIUM CHLORIDE, AND POTASSIUM CHLORIDE 125 ML/HR: 50; 4.5; 1.49 INJECTION, SOLUTION INTRAVENOUS at 18:13

## 2018-01-02 RX ADMIN — PIPERACILLIN SODIUM,TAZOBACTAM SODIUM 3.38 G: 3; .375 INJECTION, POWDER, FOR SOLUTION INTRAVENOUS at 01:07

## 2018-01-02 RX ADMIN — DEXTROSE MONOHYDRATE, SODIUM CHLORIDE, AND POTASSIUM CHLORIDE 125 ML/HR: 50; 4.5; 1.49 INJECTION, SOLUTION INTRAVENOUS at 01:46

## 2018-01-02 NOTE — PROGRESS NOTES
PLAN:  Continue IVF @ 125ml/hr  Continue Zosyn  Continue Jurado for accurate Is and Os  Continue CLD  Continue drain  Labs in AM.  Obtain sitter  Consider psychiatric consult if mood/mentation does not improve throughout the day  Start Protonix, continue Lovenox, SCDs, IS. PT eval.      ASSESSMENT:  Admit Date: 12/31/2017   2 Days Post-Op  Procedure(s):  LAPAROTOMY EXPLORATORY REDUCTION AND REPAIR OF INCARERATED UMBILICAL HERNIA, SIGMOIDCOLECTOMY AND COLOSTOMY, BIOPSY OF MESENTERIC NODULES, DEBRIDEMENT OF SKIN, SUBCUTANEOUS, FASCIA  ABDOMINAL WALL    Principal Problem:    Incarcerated umbilical hernia (73/99/0632)    Active Problems:    Essential hypertension (9/18/2012)      NATALIIA on CPAP (9/18/2012)      CRI (chronic renal insufficiency) (1/21/2013)         SUBJECTIVE:  Pt confused this AM and expressing wishes to \"end it all\". Per RN, pt has been intermittently confused. AF, HTN overnight, oxygenating well on room air. Gas noted in ostomy bag. No labs this AM. UOP 825ml/24 hours. OBJECTIVE:  Constitutional: Alert oriented cooperative patient in no acute distress; appears stated age   Visit Vitals    /66 (BP 1 Location: Left arm, BP Patient Position: At rest)    Pulse 90    Temp 98 °F (36.7 °C)    Resp 18    Ht 5' 10\" (1.778 m)    Wt 204 lb (92.5 kg)    SpO2 92%    BMI 29.27 kg/m2     Eyes:Sclera are clear. ENMT: no external lesions gross hearing normal; no obvious neck masses, no ear or lip lesions  CV: RRR. Normal perfusion  Resp: No JVD. Breathing is  non-labored; no audible wheezing. CTAB. GI: soft and non-distended; appropriately tender. Incision healing appropriately with staples intact. Stoma is pink and edematous, + gas in ostomy bag, dark blood tinged liquid in bag. + BS X 4 quadrants. Drain with minimal serous drainage. Musculoskeletal: unremarkable with normal function. No embolic signs or cyanosis.    Neuro:  Oriented; moves all 4; no focal deficits  Psychiatric: depressed mood and affect;  no memory impairment      Patient Vitals for the past 24 hrs:   BP Temp Pulse Resp SpO2   01/02/18 0300 141/66 98 °F (36.7 °C) 90 18 92 %   01/01/18 2300 152/72 98.1 °F (36.7 °C) 89 17 94 %   01/01/18 1900 131/65 98.2 °F (36.8 °C) 94 17 90 %   01/01/18 1542 120/64 97.9 °F (36.6 °C) 81 21 91 %   01/01/18 1107 127/65 97.2 °F (36.2 °C) 90 19 92 %     Labs:  Recent Labs      01/01/18   0547  12/31/17   0813   12/31/17   0812   WBC  13.6*  27.2*   < >   --    HGB  8.4*  9.2*   < >   --    PLT  279  327   < >   --    NA  143  140   < >   --    K  4.6  3.7   < >   --    CL  107  102   < >   --    CO2  30  30   < >   --    BUN  38*  43*   < >   --    CREA  1.19*  1.61*   < >   --    GLU  143*  94   < >   --    TBILI   --   0.9   --    --    SGOT   --   30   --    --    ALT   --   23   --    --    AP   --   122   --    --    LPSE   --    --    --   62*    < > = values in this interval not displayed.      MICHELE Dias

## 2018-01-02 NOTE — PROGRESS NOTES
END OF SHIFT NOTE:    INTAKE/OUTPUT  01/01 0701 - 01/02 0700  In: 7638 [I.V.:2223]  Out: 825 [Urine:825]  Voiding: YES  Catheter: YES  Drain:   Colostomy 12/31/17 Left Abdomen (Active)   Drainage Color Other (Comment) 1/2/2018 12:15 AM   Site Assessment Moist;Pink 1/2/2018 12:15 AM   Output (ml) 0 mL 1/2/2018 12:15 AM       Rob Drain #1 12/31/17 Anterior Abdomen (Active)   Site Assessment Clean, dry, & intact 1/2/2018 12:15 AM   Dressing Status Clean, dry, & intact 1/2/2018 12:15 AM   Drainage Description Serosanguinous 1/2/2018 12:15 AM   Rob Drain Airleak No 1/2/2018 12:15 AM   Status Patent; Charged;Draining 1/2/2018 12:15 AM   Y Connector Used No 1/1/2018 12:15 AM   Output (ml) 0 ml 1/1/2018  7:52 AM               Flatus: Patient does have flatus present. Stool:  Colostomy occurrences. Characteristics:       Emesis: 0 occurrences. Characteristics:        VITAL SIGNS  Patient Vitals for the past 12 hrs:   Temp Pulse Resp BP SpO2   01/02/18 0300 98 °F (36.7 °C) 90 18 141/66 92 %   01/01/18 2300 98.1 °F (36.7 °C) 89 17 152/72 94 %       Pain Assessment  Pain Intensity 1: 8 (01/02/18 0312)  Pain Location 1: Abdomen  Pain Intervention(s) 1: Medication (see MAR)  Patient Stated Pain Goal: 0    Ambulating  No    Shift report given to oncoming nurse at the bedside.     Rajeev Pepe, RN

## 2018-01-02 NOTE — PROGRESS NOTES
Called pt and let her know that Dr. Will Beckford is aware that she is in the hospital and that he will follow her progress.

## 2018-01-02 NOTE — PROGRESS NOTES
Problem: Mobility Impaired (Adult and Pediatric)  Goal: *Acute Goals and Plan of Care (Insert Text)  STG:  (1.)Ms. Sasha Nagy  will move from supine to sit and sit to supine via log rolling to sidelying in flat bed without siderails with maximal assistance within 3 day(s). (2.)Ms. Sasha Nagy  will transfer from bed to chair and chair to bed with maximal assistance using the least restrictive/no device within 3 day(s). (3.)Ms. Sasha Nagy  will ambulate with maximal assistance for 5 feet with the least restrictive device within 3 day(s). LTG:  (1.)Ms. Sasha Nagy  will move from supine to sit and sit to supine via logrolling  to side in flat bed without siderails with moderate assistance  within 7 day(s). (2.)Ms. Sasha Nagy  will transfer from bed to chair and chair to bed with moderate assistance  using the least restrictive/no device within 7 day(s). (3.)Ms. Sasha Nagy  will ambulate with moderate assistance  for 25+ feet with the least restrictive/no device within 7 day(s). PHYSICAL THERAPY: Initial Assessment, AM 1/2/2018  INPATIENT: Hospital Day: 3  [place] / [place] / Product Type: Medicare /   Trial Period   NAME/AGE/GENDER: Bert Escalante is a 80 y.o. female   PRIMARY DIAGNOSIS: Incarcerated umbilical hernia  Incarcerated umbilical hernia Incarcerated umbilical hernia Incarcerated umbilical hernia  Procedure(s) (LRB):  LAPAROTOMY EXPLORATORY REDUCTION AND REPAIR OF INCARERATED UMBILICAL HERNIA, SIGMOIDCOLECTOMY AND COLOSTOMY, BIOPSY OF MESENTERIC NODULES, DEBRIDEMENT OF SKIN, SUBCUTANEOUS, FASCIA  ABDOMINAL WALL (N/A)  2 Days Post-Op  ICD-10: Treatment Diagnosis:   · Generalized Muscle Weakness (M62.81)  · Other abnormalities of gait and mobility (R26.89)  · History of falling (Z91.81)   Precaution/Allergies:  Review of patient's allergies indicates no known allergies. ASSESSMENT:     Ms. Sasha Nagy presents supine in bed with son at bedside, admitted for above surgery.   Patient talkative, but when asked to move adamantly refused. Patient kept repeating that she \" last night\" and just wants to die now. DIL talked her into increasing into mobility slowly with bed into chair and patient agreeable to try that. Encouraged patient to splint with pillow, but she refused. When [facility] reached ~40 degrees, patient yelled and demanded that she stop moving. After a few minutes discomfort subsided and she was talkative again. Patient able to move B UE's with [facility] strength except R UE weaker than L due to recent fracture. Patient did abduct/adduct hips, extend knees, and pump ankles in bed when instructed to. Increased chair position to 45 degrees HOB and patient initially complained, but when distracted seemed fine. Explained benefits of increasing mobility. Ms. Philipp Llanos would benefit from a trial period of skilled physical therapy (medically necessary) to address her deficits and maximize her function. If she continues to refuse mobility, will discontinue skilled PT. This section established at most recent assessment   PROBLEM LIST (Impairments causing functional limitations):  1. Decreased Strength  2. Decreased ADL/Functional Activities  3. Decreased Transfer Abilities  4. Decreased Ambulation Ability/Technique  5. Decreased Balance  6. Increased Pain  7. Decreased Activity Tolerance  8. Decreased Knowledge of Precautions   INTERVENTIONS PLANNED: (Benefits and precautions of physical therapy have been discussed with the patient.)  1. Balance Exercise  2. Bed Mobility  3. Family Education  4. Gait Training  5. Therapeutic Activites  6. Therapeutic Exercise/Strengthening  7. Transfer Training  8. education  9.  Group Therapy     TREATMENT PLAN: Frequency/Duration: 3 times a week for duration of hospital stay  Rehabilitation Potential For Stated Goals: [place] Going REHABILITATION/EQUIPMENT: (at time of discharge pending progress): Due to the probability of continued deficits (see above) this patient will likely need continued skilled physical therapy after discharge. If agreeable. Equipment:    None at this time              HISTORY:   History of Present Injury/Illness (Reason for Referral):  Per MD note, \"History of Present Illness: Pt is a 80year old WF presenting to the ER earlier today with abdominal pain at the site of her long-standing umbilical hernia. This has been completely asymptomatic until last night, per patient. It has become acutely enlarged and tender prompting her SNF to bring her for evaluation. She denies any antecedent problems from the hernia. She denies fever, chills, nausea,vomiting, or recent alteration of bowel function. She has no prior abdominal surgical history\"  Past Medical History/Comorbidities:   Ms. Ivan Day  has a past medical history of Compression fracture of lumbar spine, non-traumatic ([facility] [address] (2007); Depression (9/18/2012); DJD (degenerative joint disease) (9/18/2012); GERD (gastroesophageal reflux disease) (9/18/2012); HTN (hypertension) (9/18/2012); Hypertension; NATALIIA on CPAP (9/18/2012); Osteoporosis (9/18/2012); Other ill-defined conditions(799.89); and Psychiatric disorder. Ms. Ivan Day  has a past surgical history that includes hx heent and hx cataract removal.  Social History/Living Environment:      Prior Level of Function/Work/Activity:  Patient lived alone independently prior to November when she fell and suffered R wrist fracture. Since then has been in rehab and respite care per family. Family would like patient to be in [facility], but are unsure about future plans. Personal Factors:          Sex:  female        Age:  80 y.o. Number of Personal Factors/Comorbidities that affect the Plan of Care: 1-2: MODERATE COMPLEXITY   EXAMINATION:   Most Recent Physical Functioning:   Gross Assessment:  AROM: Generally decreased, functional  Strength: Grossly decreased, non-functional               Posture:  Posture (WDL): Exceptions to WDL  Posture Assessment:  Forward head, Rounded shoulders, Kyphosis  Balance:  Sitting: Impaired  Sitting - Static: Supported sitting Bed Mobility:  Supine to Sit: Total assistance  Wheelchair Mobility:     Transfers: refused     Gait:            Body Structures Involved:  1. Digestive Structures  2. Joints  3. Muscles  4. Ligaments Body Functions Affected:  1. Sensory/Pain  2. Movement Related  3. Digestive Activities and Participation Affected:  1. Mobility  2. Self Care  3. Domestic Life   Number of elements that affect the Plan of Care: 4+: HIGH COMPLEXITY   CLINICAL PRESENTATION:   Presentation: Evolving clinical presentation with unstable and unpredictable characteristics: HIGH COMPLEXITY   CLINICAL DECISION MAKIN [facility] Inpatient Short Form  How much difficulty does the patient currently have. .. Unable A Lot A Little None   1. Turning over in bed (including adjusting bedclothes, sheets and blankets)? [x] 1   [] 2   [] 3   [] 4   2. Sitting down on and standing up from a chair with arms ( e.g., wheelchair, bedside commode, etc.)   [x] 1   [] 2   [] 3   [] 4   3. Moving from lying on back to sitting on the side of the bed? [x] 1   [] 2   [] 3   [] 4   How much help from another person does the patient currently need. .. Total A Lot A Little None   4. Moving to and from a bed to a chair (including a wheelchair)? [x] 1   [] 2   [] 3   [] 4   5. Need to walk in hospital room? [x] 1   [] 2   [] 3   [] 4   6. Climbing 3-5 steps with a railing? [x] 1   [] 2   [] 3   [] 4   © , Trustees of [address] [place], [place] [address], under license to [employer]. All rights reserved      Score:  Initial: 6 Most Recent: X (Date: -- )    Interpretation of Tool:  Represents activities that are increasingly more difficult (i.e. Bed mobility, Transfers, Gait). Score 24 23 22-20 19-15 14-10 9-7 6     Modifier CH CI CJ CK CL CM CN      ?  Mobility - Walking and Moving Around:     - CURRENT STATUS: CN - 100% impaired, limited or restricted    - GOAL STATUS: CM - 80%-99% impaired, limited or restricted    - D/C STATUS:  ---------------To be determined---------------  [place] / [place] / Product Type: Medicare /      Medical Necessity:     · Patient is expected to demonstrate progress in strength, range of motion, balance, coordination and functional technique to decrease assistance required with all functional mobility. Reason for Services/Other Comments:  · Patient continues to require skilled intervention due to medical complications and patient unable to attend/participate in therapy as expected. Use of outcome tool(s) and clinical judgement create a POC that gives a: Difficult prediction of patient's progress: HIGH COMPLEXITY            TREATMENT:   (In addition to Assessment/Re-Assessment sessions the following treatments were rendered)   Pre-treatment Symptoms/Complaints:    Pain: Initial:   Pain Intensity 1: 2  Pain Location 1: Abdomen  Pain Intervention(s) 1: Repositioned  Post Session:  Unchanged. Assessment/Reassessment only, no treatment provided today    Braces/Orthotics/Lines/Etc:   · IV  · O2 Device: Nasal cannula  Treatment/Session Assessment:    · Response to Treatment:  Agreeable to limited movement. · Interdisciplinary Collaboration:   o Physical Therapist  o Registered Nurse  · After treatment position/precautions:   o Bed/Chair-wheels locked  o Call light within reach  o RN notified  o Family at bedside  o bed in modified chair position   · Compliance with Program/Exercises: Will assess as treatment progresses. · Recommendations/Intent for next treatment session: \"Next visit will focus on advancements to more challenging activities and reduction in assistance provided\".   Total Treatment Duration:  PT Patient Time In/Time Out  Time In: 1125  Time Out: Nohelia Aldrich, PT, DPT

## 2018-01-03 LAB
ALBUMIN SERPL-MCNC: 1.7 G/DL (ref 3.2–4.6)
ALBUMIN/GLOB SERPL: 0.5 {RATIO} (ref 1.2–3.5)
ALP SERPL-CCNC: 177 U/L (ref 50–136)
ALT SERPL-CCNC: 29 U/L (ref 12–65)
ANION GAP SERPL CALC-SCNC: 4 MMOL/L (ref 7–16)
AST SERPL-CCNC: 40 U/L (ref 15–37)
BASOPHILS # BLD: 0 K/UL (ref 0–0.2)
BASOPHILS NFR BLD: 0 % (ref 0–2)
BILIRUB SERPL-MCNC: 0.4 MG/DL (ref 0.2–1.1)
BUN SERPL-MCNC: 16 MG/DL (ref 8–23)
CALCIUM SERPL-MCNC: 8.8 MG/DL (ref 8.3–10.4)
CHLORIDE SERPL-SCNC: 108 MMOL/L (ref 98–107)
CO2 SERPL-SCNC: 30 MMOL/L (ref 21–32)
CREAT SERPL-MCNC: 0.67 MG/DL (ref 0.6–1)
DIFFERENTIAL METHOD BLD: ABNORMAL
EOSINOPHIL # BLD: 0.1 K/UL (ref 0–0.8)
EOSINOPHIL NFR BLD: 1 % (ref 0.5–7.8)
ERYTHROCYTE [DISTWIDTH] IN BLOOD BY AUTOMATED COUNT: 17.1 % (ref 11.9–14.6)
GLOBULIN SER CALC-MCNC: 3.5 G/DL (ref 2.3–3.5)
GLUCOSE SERPL-MCNC: 98 MG/DL (ref 65–100)
HCT VFR BLD AUTO: 26.5 % (ref 35.8–46.3)
HGB BLD-MCNC: 7.5 G/DL (ref 11.7–15.4)
IMM GRANULOCYTES # BLD: 0.1 K/UL (ref 0–0.5)
IMM GRANULOCYTES NFR BLD AUTO: 2 % (ref 0–5)
LYMPHOCYTES # BLD: 1.1 K/UL (ref 0.5–4.6)
LYMPHOCYTES NFR BLD: 15 % (ref 13–44)
MCH RBC QN AUTO: 23.4 PG (ref 26.1–32.9)
MCHC RBC AUTO-ENTMCNC: 28.3 G/DL (ref 31.4–35)
MCV RBC AUTO: 82.8 FL (ref 79.6–97.8)
MM INDURATION POC: NORMAL 0 MM (ref 0–5)
MONOCYTES # BLD: 0.8 K/UL (ref 0.1–1.3)
MONOCYTES NFR BLD: 10 % (ref 4–12)
NEUTS SEG # BLD: 5.4 K/UL (ref 1.7–8.2)
NEUTS SEG NFR BLD: 72 % (ref 43–78)
PLATELET # BLD AUTO: 325 K/UL (ref 150–450)
PMV BLD AUTO: 10.2 FL (ref 10.8–14.1)
POTASSIUM SERPL-SCNC: 4.7 MMOL/L (ref 3.5–5.1)
PPD POC: NORMAL NEGATIVE
PROT SERPL-MCNC: 5.2 G/DL (ref 6.3–8.2)
RBC # BLD AUTO: 3.2 M/UL (ref 4.05–5.25)
SODIUM SERPL-SCNC: 142 MMOL/L (ref 136–145)
WBC # BLD AUTO: 7.6 K/UL (ref 4.3–11.1)

## 2018-01-03 PROCEDURE — 80053 COMPREHEN METABOLIC PANEL: CPT | Performed by: PHYSICIAN ASSISTANT

## 2018-01-03 PROCEDURE — 36415 COLL VENOUS BLD VENIPUNCTURE: CPT | Performed by: PHYSICIAN ASSISTANT

## 2018-01-03 PROCEDURE — 94760 N-INVAS EAR/PLS OXIMETRY 1: CPT

## 2018-01-03 PROCEDURE — 74011000250 HC RX REV CODE- 250: Performed by: PHYSICIAN ASSISTANT

## 2018-01-03 PROCEDURE — 65270000029 HC RM PRIVATE

## 2018-01-03 PROCEDURE — C9113 INJ PANTOPRAZOLE SODIUM, VIA: HCPCS | Performed by: PHYSICIAN ASSISTANT

## 2018-01-03 PROCEDURE — 85025 COMPLETE CBC W/AUTO DIFF WBC: CPT | Performed by: PHYSICIAN ASSISTANT

## 2018-01-03 PROCEDURE — 77010033678 HC OXYGEN DAILY

## 2018-01-03 PROCEDURE — 74011250637 HC RX REV CODE- 250/637: Performed by: SURGERY

## 2018-01-03 PROCEDURE — 74011250636 HC RX REV CODE- 250/636: Performed by: SURGERY

## 2018-01-03 PROCEDURE — 74011000258 HC RX REV CODE- 258: Performed by: SURGERY

## 2018-01-03 PROCEDURE — 74011250636 HC RX REV CODE- 250/636: Performed by: PHYSICIAN ASSISTANT

## 2018-01-03 RX ADMIN — DEXTROSE MONOHYDRATE, SODIUM CHLORIDE, AND POTASSIUM CHLORIDE 125 ML/HR: 50; 4.5; 1.49 INJECTION, SOLUTION INTRAVENOUS at 10:01

## 2018-01-03 RX ADMIN — DEXTROSE MONOHYDRATE, SODIUM CHLORIDE, AND POTASSIUM CHLORIDE 125 ML/HR: 50; 4.5; 1.49 INJECTION, SOLUTION INTRAVENOUS at 18:31

## 2018-01-03 RX ADMIN — SODIUM CHLORIDE 40 MG: 9 INJECTION INTRAMUSCULAR; INTRAVENOUS; SUBCUTANEOUS at 09:55

## 2018-01-03 RX ADMIN — ENOXAPARIN SODIUM 30 MG: 30 INJECTION SUBCUTANEOUS at 18:31

## 2018-01-03 RX ADMIN — PIPERACILLIN SODIUM,TAZOBACTAM SODIUM 3.38 G: 3; .375 INJECTION, POWDER, FOR SOLUTION INTRAVENOUS at 18:31

## 2018-01-03 RX ADMIN — HYDROMORPHONE HYDROCHLORIDE 0.5 MG: 2 INJECTION, SOLUTION INTRAMUSCULAR; INTRAVENOUS; SUBCUTANEOUS at 03:53

## 2018-01-03 RX ADMIN — Medication 10 ML: at 14:00

## 2018-01-03 RX ADMIN — DEXTROSE MONOHYDRATE, SODIUM CHLORIDE, AND POTASSIUM CHLORIDE 125 ML/HR: 50; 4.5; 1.49 INJECTION, SOLUTION INTRAVENOUS at 02:04

## 2018-01-03 RX ADMIN — Medication 1 AMPULE: at 21:32

## 2018-01-03 RX ADMIN — PIPERACILLIN SODIUM,TAZOBACTAM SODIUM 3.38 G: 3; .375 INJECTION, POWDER, FOR SOLUTION INTRAVENOUS at 10:01

## 2018-01-03 RX ADMIN — HYDROMORPHONE HYDROCHLORIDE 0.5 MG: 2 INJECTION, SOLUTION INTRAMUSCULAR; INTRAVENOUS; SUBCUTANEOUS at 19:41

## 2018-01-03 RX ADMIN — PIPERACILLIN SODIUM,TAZOBACTAM SODIUM 3.38 G: 3; .375 INJECTION, POWDER, FOR SOLUTION INTRAVENOUS at 01:58

## 2018-01-03 RX ADMIN — Medication 1 AMPULE: at 10:00

## 2018-01-03 NOTE — PROGRESS NOTES
Patient awake, alert to person and situation. Needs reminders about place. Taking sips of liquids w/o any problems, denies nausea and/or pain.

## 2018-01-03 NOTE — ROUTINE PROCESS
END OF SHIFT NOTE:    INTAKE/OUTPUT  01/02 0701 - 01/03 0700  In: 7449 [P.O.:180; I.V.:3193]  Out: 400 [Urine:300; Drains:100]  Voiding: YES  Catheter: NO  Drain:   Colostomy 12/31/17 Left Abdomen (Active)   Drainage Color Sanguinous 1/2/2018 11:14 PM   Site Assessment Moist;Pink 1/2/2018 11:14 PM   Output (ml) 0 mL 1/2/2018  8:03 PM       Rob Drain #1 12/31/17 Anterior Abdomen (Active)   Site Assessment Clean, dry, & intact 1/2/2018 11:14 PM   Dressing Status Clean, dry, & intact 1/2/2018  8:03 PM   Drainage Description Serosanguinous 1/2/2018 11:14 PM   Rob Drain Airleak No 1/2/2018 11:14 PM   Status Patent; Charged;Draining;Suction (specify 1/2/2018 11:14 PM   Y Connector Used No 1/2/2018 11:14 PM   Output (ml) 100 ml 1/2/2018  6:58 PM               Flatus: Patient does not have flatus present. Stool:  0 occurrences. Characteristics:       Emesis: 0 occurrences. Characteristics:        VITAL SIGNS  Patient Vitals for the past 12 hrs:   Temp Pulse Resp BP SpO2   01/03/18 0330 97.3 °F (36.3 °C) 91 18 154/81 96 %   01/02/18 2250 97.8 °F (36.6 °C) 93 18 145/75 98 %       Pain Assessment  Pain Intensity 1: 0 (01/03/18 0453)  Pain Location 1: Abdomen  Pain Intervention(s) 1: Medication (see MAR)  Patient Stated Pain Goal: 0    Ambulating  No    Shift report given to oncoming nurse at the bedside.     Dorothea Parson LPN

## 2018-01-03 NOTE — PROGRESS NOTES
Patient wakes up with voice stimulation. Alert, asking for son, states Eusebio Dash is coming today after work\". C/o back pain but declines pain meds. Sitter at bedside. Will continue to monitor.

## 2018-01-03 NOTE — PROGRESS NOTES
Problem: Falls - Risk of  Goal: *Absence of Falls  Document Bessy Fall Risk and appropriate interventions in the flowsheet.    Outcome: Progressing Towards Goal  Fall Risk Interventions:  Mobility Interventions: Bed/chair exit alarm, Communicate number of staff needed for ambulation/transfer, Patient to call before getting OOB, PT Consult for mobility concerns    Mentation Interventions: Adequate sleep, hydration, pain control, Bed/chair exit alarm, Door open when patient unattended, Reorient patient, Room close to nurse's station, Toileting rounds    Medication Interventions: Bed/chair exit alarm, Patient to call before getting OOB, Teach patient to arise slowly    Elimination Interventions: Call light in reach, Patient to call for help with toileting needs, Toileting schedule/hourly rounds    History of Falls Interventions: Bed/chair exit alarm, Consult care management for discharge planning, Door open when patient unattended, Room close to nurse's station

## 2018-01-03 NOTE — PROGRESS NOTES
No c/o-- just woke up  Tolerated liquids  abd soft, nondistended. Scant gas in ostomy bag. Stoma pink, mildly edematous  Abdominal wall non-cellulitic. MEG clear, blood-tinged, scant    Plan-  Full liquids  Placement.

## 2018-01-04 LAB
MM INDURATION POC: NORMAL 0 MM (ref 0–5)
PPD POC: NORMAL NEGATIVE

## 2018-01-04 PROCEDURE — C9113 INJ PANTOPRAZOLE SODIUM, VIA: HCPCS | Performed by: PHYSICIAN ASSISTANT

## 2018-01-04 PROCEDURE — 97530 THERAPEUTIC ACTIVITIES: CPT

## 2018-01-04 PROCEDURE — 74011250637 HC RX REV CODE- 250/637: Performed by: SURGERY

## 2018-01-04 PROCEDURE — 74011000258 HC RX REV CODE- 258: Performed by: SURGERY

## 2018-01-04 PROCEDURE — 74011250636 HC RX REV CODE- 250/636: Performed by: PHYSICIAN ASSISTANT

## 2018-01-04 PROCEDURE — 74011000250 HC RX REV CODE- 250: Performed by: PHYSICIAN ASSISTANT

## 2018-01-04 PROCEDURE — 74011250636 HC RX REV CODE- 250/636: Performed by: SURGERY

## 2018-01-04 PROCEDURE — 65270000029 HC RM PRIVATE

## 2018-01-04 RX ORDER — HYDROCODONE BITARTRATE AND ACETAMINOPHEN 5; 325 MG/1; MG/1
1-2 TABLET ORAL
Status: DISCONTINUED | OUTPATIENT
Start: 2018-01-04 | End: 2018-01-08 | Stop reason: HOSPADM

## 2018-01-04 RX ADMIN — PIPERACILLIN SODIUM,TAZOBACTAM SODIUM 3.38 G: 3; .375 INJECTION, POWDER, FOR SOLUTION INTRAVENOUS at 01:37

## 2018-01-04 RX ADMIN — DEXTROSE MONOHYDRATE, SODIUM CHLORIDE, AND POTASSIUM CHLORIDE 125 ML/HR: 50; 4.5; 1.49 INJECTION, SOLUTION INTRAVENOUS at 02:30

## 2018-01-04 RX ADMIN — DEXTROSE MONOHYDRATE, SODIUM CHLORIDE, AND POTASSIUM CHLORIDE 125 ML/HR: 50; 4.5; 1.49 INJECTION, SOLUTION INTRAVENOUS at 11:21

## 2018-01-04 RX ADMIN — PIPERACILLIN SODIUM,TAZOBACTAM SODIUM 3.38 G: 3; .375 INJECTION, POWDER, FOR SOLUTION INTRAVENOUS at 11:19

## 2018-01-04 RX ADMIN — DEXTROSE MONOHYDRATE, SODIUM CHLORIDE, AND POTASSIUM CHLORIDE 75 ML/HR: 50; 4.5; 1.49 INJECTION, SOLUTION INTRAVENOUS at 16:41

## 2018-01-04 RX ADMIN — SODIUM CHLORIDE 40 MG: 9 INJECTION INTRAMUSCULAR; INTRAVENOUS; SUBCUTANEOUS at 11:18

## 2018-01-04 RX ADMIN — HYDROCODONE BITARTRATE AND ACETAMINOPHEN 1 TABLET: 5; 325 TABLET ORAL at 20:45

## 2018-01-04 RX ADMIN — Medication 1 AMPULE: at 20:45

## 2018-01-04 RX ADMIN — Medication 1 AMPULE: at 11:21

## 2018-01-04 RX ADMIN — ENOXAPARIN SODIUM 30 MG: 30 INJECTION SUBCUTANEOUS at 16:40

## 2018-01-04 RX ADMIN — HYDROMORPHONE HYDROCHLORIDE 0.5 MG: 2 INJECTION, SOLUTION INTRAMUSCULAR; INTRAVENOUS; SUBCUTANEOUS at 03:06

## 2018-01-04 RX ADMIN — Medication 10 ML: at 16:39

## 2018-01-04 NOTE — PROGRESS NOTES
Patient with poor appetite. Eating about 10% of meals. Drinking sips of fluids throughout day. Denies any nausea or pain.

## 2018-01-04 NOTE — PROGRESS NOTES
END OF SHIFT NOTE:    INTAKE/OUTPUT  01/03 0701 - 01/04 0700  In: 5058 [P.O.:240; I.V.:2479]  Out: 10 [Drains:10]  Voiding: YES, incontinent briefs. Catheter: NO  Drain:   Colostomy 12/31/17 Left Abdomen (Active)   Drainage Color Sanguinous 1/4/2018 12:33 AM   Site Assessment Clean, dry, intact 1/4/2018 12:33 AM   Output (ml) 0 mL 1/4/2018 12:33 AM       Rob Drain #1 12/31/17 Anterior Abdomen (Active)   Site Assessment Clean, dry, & intact 1/4/2018 12:33 AM   Dressing Status Clean, dry, & intact 1/4/2018 12:33 AM   Drainage Description Serosanguinous 1/4/2018 12:33 AM   Rbo Drain Airleak No 1/4/2018 12:33 AM   Status Patent; Charged;Draining;Suction (specify 1/4/2018 12:33 AM   Y Connector Used No 1/4/2018 12:33 AM   Output (ml) 20 ml 1/4/2018  5:09 PM               Flatus: Patient does not have flatus present. Stool:  0 occurrences. Scant amounts of maroon discharge in colostomy bag. Characteristics:  Stool Assessment  Stool Color: Maroon  Stool Appearance: Watery  Stool Amount: Smear  Stool Source/Status: Colostomy    Emesis: 0 occurrences. Characteristics:        VITAL SIGNS  Patient Vitals for the past 12 hrs:   Temp Pulse Resp BP SpO2   01/04/18 1452 97.7 °F (36.5 °C) 86 16 134/67 95 %   01/04/18 1100 97.6 °F (36.4 °C) 83 16 146/73 94 %   01/04/18 0730 96.8 °F (36 °C) 79 16 170/75 91 %       Pain Assessment  Pain Intensity 1: 0 (01/04/18 0957)  Pain Location 1: Abdomen  Pain Intervention(s) 1: Medication (see MAR)  Patient Stated Pain Goal: 0    Ambulating  No. Pt and OT following. Shift report given to oncoming nurse at the bedside.     Ted Magana RN

## 2018-01-04 NOTE — PROGRESS NOTES
Problem: Mobility Impaired (Adult and Pediatric)  Goal: *Acute Goals and Plan of Care (Insert Text)  STG:  (1.)Ms. Lori Mueller  will move from supine to sit and sit to supine via log rolling to sidelying in flat bed without siderails with maximal assistance within 3 day(s). (2.)Ms. Lori Mueller  will transfer from bed to chair and chair to bed with maximal assistance using the least restrictive/no device within 3 day(s). (3.)Ms. Lori Mueller  will ambulate with maximal assistance for 5 feet with the least restrictive device within 3 day(s). LTG:  (1.)Ms. Lori Mueller  will move from supine to sit and sit to supine via logrolling  to side in flat bed without siderails with moderate assistance  within 7 day(s). (2.)Ms. Lori Mueller  will transfer from bed to chair and chair to bed with moderate assistance  using the least restrictive/no device within 7 day(s). (3.)Ms. Lori Mueller  will ambulate with moderate assistance  for 25+ feet with the least restrictive/no device within 7 day(s). PHYSICAL THERAPY: Daily Note, Treatment Day: 1st, AM 1/4/2018  INPATIENT: Hospital Day: 5  [place] / [place] / Product Type: Medicare /   Trial Period   NAME/AGE/GENDER: German Day is a 80 y.o. female   PRIMARY DIAGNOSIS: Incarcerated umbilical hernia  Incarcerated umbilical hernia Incarcerated umbilical hernia Incarcerated umbilical hernia  Procedure(s) (LRB):  LAPAROTOMY EXPLORATORY REDUCTION AND REPAIR OF INCARERATED UMBILICAL HERNIA, SIGMOIDCOLECTOMY AND COLOSTOMY, BIOPSY OF MESENTERIC NODULES, DEBRIDEMENT OF SKIN, SUBCUTANEOUS, FASCIA  ABDOMINAL WALL (N/A)  4 Days Post-Op  ICD-10: Treatment Diagnosis:   · Generalized Muscle Weakness (M62.81)  · Other abnormalities of gait and mobility (R26.89)  · History of falling (Z91.81)   Precaution/Allergies:  Review of patient's allergies indicates no known allergies. ASSESSMENT:     Ms. Lori Mueller presents supine in bed.     Patient kept repeating that she \"let me go\" and just wants to \"go\" now. Patient yelled and demanded that she stop moving. Patient able to move to EOB before more verbal thoughts of 'let me go\" \"I can't\" when offering no effort. Patient resisting after 1min of sitting and 'flopping' into extension onto bed in diagonal position with 1 LE on bed and other off. Explained benefits of increasing mobility. Lots of time is spent attempting to encourage, participate using effort. Ms. Nickie Seaman would benefit from a trial period of skilled physical therapy (medically necessary) to address her deficits and maximize her function. If she continues to refuse mobility, will discontinue skilled PT. This section established at most recent assessment   PROBLEM LIST (Impairments causing functional limitations):  1. Decreased Strength  2. Decreased ADL/Functional Activities  3. Decreased Transfer Abilities  4. Decreased Ambulation Ability/Technique  5. Decreased Balance  6. Increased Pain  7. Decreased Activity Tolerance  8. Decreased Knowledge of Precautions   INTERVENTIONS PLANNED: (Benefits and precautions of physical therapy have been discussed with the patient.)  1. Balance Exercise  2. Bed Mobility  3. Family Education  4. Gait Training  5. Therapeutic Activites  6. Therapeutic Exercise/Strengthening  7. Transfer Training  8. education  9. Group Therapy     TREATMENT PLAN: Frequency/Duration: 3 times a week for duration of hospital stay  Rehabilitation Potential For Stated Goals: [facility] REHABILITATION/EQUIPMENT: (at time of discharge pending progress): Due to the probability of continued deficits (see above) this patient will likely need continued skilled physical therapy after discharge. If agreeable.   Equipment:    None at this time              HISTORY:   History of Present Injury/Illness (Reason for Referral):  Per MD note, \"History of Present Illness: Pt is a 80year old WF presenting to the ER earlier today with abdominal pain at the site of her long-standing umbilical hernia. This has been completely asymptomatic until last night, per patient. It has become acutely enlarged and tender prompting her SNF to bring her for evaluation. She denies any antecedent problems from the hernia. She denies fever, chills, nausea,vomiting, or recent alteration of bowel function. She has no prior abdominal surgical history\"  Past Medical History/Comorbidities:   Ms. Andreia Martinez  has a past medical history of Compression fracture of lumbar spine, non-traumatic ([facility] [address] (2007); Depression (9/18/2012); DJD (degenerative joint disease) (9/18/2012); GERD (gastroesophageal reflux disease) (9/18/2012); HTN (hypertension) (9/18/2012); Hypertension; NATALIIA on CPAP (9/18/2012); Osteoporosis (9/18/2012); Other ill-defined conditions(799.89); and Psychiatric disorder. Ms. Andreia Martinez  has a past surgical history that includes hx heent and hx cataract removal.  Social History/Living Environment:   Home Environment: [address] [place] [address] Name: [address]  One/Two Story Residence: One story  Living Alone: No  Support Systems: Child(harvey)  Patient Expects to be Discharged to[de-identified] 2 [facility] facility  Current DME Used/Available at Home: Oxygen, portable, Wheelchair  Prior Level of Function/Work/Activity:  Patient lived alone independently prior to November when she fell and suffered R wrist fracture. Since then has been in rehab and respite care per family. Family would like patient to be in [facility], but are unsure about future plans. Personal Factors:          Sex:  female        Age:  80 y.o.    Number of Personal Factors/Comorbidities that affect the Plan of Care: 1-2: MODERATE COMPLEXITY   EXAMINATION:   Most Recent Physical Functioning:   Gross Assessment:                  Posture:     Balance:  Sitting: Impaired  Sitting - Static: Poor (constant support)  Sitting - Dynamic: Poor (constant support)  Standing: Impaired  Standing - Static: Poor  Standing - Dynamic : Poor Bed Mobility:  Rolling: Maximum assistance  Supine to Sit: Maximum assistance  Scooting: Maximum assistance  Wheelchair Mobility:     Transfers: refused  Sit to Stand: Maximum assistance  Gait:            Body Structures Involved:  1. Digestive Structures  2. Joints  3. Muscles  4. Ligaments Body Functions Affected:  1. Sensory/Pain  2. Movement Related  3. Digestive Activities and Participation Affected:  1. Mobility  2. Self Care  3. Domestic Life   Number of elements that affect the Plan of Care: 4+: HIGH COMPLEXITY   CLINICAL PRESENTATION:   Presentation: Evolving clinical presentation with unstable and unpredictable characteristics: HIGH COMPLEXITY   CLINICAL DECISION MAKIN [facility] Short Form  How much difficulty does the patient currently have. .. Unable A Lot A Little None   1. Turning over in bed (including adjusting bedclothes, sheets and blankets)? [x] 1   [] 2   [] 3   [] 4   2. Sitting down on and standing up from a chair with arms ( e.g., wheelchair, bedside commode, etc.)   [x] 1   [] 2   [] 3   [] 4   3. Moving from lying on back to sitting on the side of the bed? [x] 1   [] 2   [] 3   [] 4   How much help from another person does the patient currently need. .. Total A Lot A Little None   4. Moving to and from a bed to a chair (including a wheelchair)? [x] 1   [] 2   [] 3   [] 4   5. Need to walk in hospital room? [x] 1   [] 2   [] 3   [] 4   6. Climbing 3-5 steps with a railing? [x] 1   [] 2   [] 3   [] 4   © , Trustees of [address] [place] [address], under license to [employer]. All rights reserved      Score:  Initial: 6 Most Recent: X (Date: -- )    Interpretation of Tool:  Represents activities that are increasingly more difficult (i.e. Bed mobility, Transfers, Gait). Score 24 23 22-20 19-15 14-10 9-7 6     Modifier CH CI CJ CK CL CM CN      ?  Mobility - Walking and Moving Around:     - CURRENT STATUS: CN - 100% impaired, limited or restricted    - GOAL STATUS: CM - 80%-99% impaired, limited or restricted    - D/C STATUS:  ---------------To be determined---------------  [place] / [place] / Product Type: Medicare /      Medical Necessity:     · Patient is expected to demonstrate progress in strength, range of motion, balance, coordination and functional technique to decrease assistance required with all functional mobility. Reason for Services/Other Comments:  · Patient continues to require skilled intervention due to medical complications and patient unable to attend/participate in therapy as expected. Use of outcome tool(s) and clinical judgement create a POC that gives a: Difficult prediction of patient's progress: HIGH COMPLEXITY            TREATMENT:   (In addition to Assessment/Re-Assessment sessions the following treatments were rendered)   Pre-treatment Symptoms/Complaints:    Pain: Initial:   Pain Intensity 1: 0  Post Session:  Unchanged. Therapeutic Activity: (    25min):  Therapeutic activities including Bed transfers to improve mobility, strength, balance and coordination. Required maximal   to promote static and dynamic balance in sitting, promote coordination of bilateral, lower extremity(s) and promote motor control of bilateral, lower extremity(s). Braces/Orthotics/Lines/Etc:   · IV  · O2 Device: Nasal cannula  Treatment/Session Assessment:    · Response to Treatment:  Agreeable to limited movement. · Interdisciplinary Collaboration:   o Physical Therapist  o Registered Nurse  · After treatment position/precautions:   o Bed/Chair-wheels locked  o Call light within reach  o RN notified  o Family at bedside  o bed in modified chair position   · Compliance with Program/Exercises: Will assess as treatment progresses. · Recommendations/Intent for next treatment session:   \"Next visit will focus on advancements to more challenging activities and reduction in assistance provided\".   Total Treatment Duration:  PT Patient Time In/Time Out  Time In: 0920  Time Out: [address] [facility], T

## 2018-01-04 NOTE — PROGRESS NOTES
END OF SHIFT NOTE:    INTAKE/OUTPUT  01/02 0701 - 01/03 0700  In: 0190 [P.O.:180; I.V.:3193]  Out: 400 [Urine:300; Drains:100]  Voiding: YES, incontinent briefs  Catheter: NO  Drain:   Colostomy 12/31/17 Left Abdomen (Active)   Drainage Color Sanguinous 1/2/2018 11:14 PM   Site Assessment Moist;Pink 1/2/2018 11:14 PM   Output (ml) 0 mL 1/2/2018  8:03 PM       Rob Drain #1 12/31/17 Anterior Abdomen (Active)   Site Assessment Clean, dry, & intact 1/2/2018 11:14 PM   Dressing Status Clean, dry, & intact 1/2/2018  8:03 PM   Drainage Description Serosanguinous 1/2/2018 11:14 PM   Rob Drain Airleak No 1/2/2018 11:14 PM   Status Patent; Charged;Draining;Suction (specify 1/2/2018 11:14 PM   Y Connector Used No 1/2/2018 11:14 PM   Output (ml) 100 ml 1/2/2018  6:58 PM               Flatus: Patient does not have flatus present. Stool:  0 occurrences. Characteristics:       Emesis: 0 occurrences. Characteristics:        VITAL SIGNS  Patient Vitals for the past 12 hrs:   Temp Pulse Resp BP SpO2   01/03/18 1604 97.9 °F (36.6 °C) 83 16 158/73 94 %   01/03/18 1140 98 °F (36.7 °C) 83 16 132/73 97 %   01/03/18 0800 - - - - 97 %       Pain Assessment  Pain Intensity 1: 5 (01/03/18 1942)  Pain Location 1: Abdomen  Pain Intervention(s) 1: Medication (see MAR)  Patient Stated Pain Goal: 0    Ambulating  No    Shift report given to oncoming nurse at the bedside.     Sherry Manning RN

## 2018-01-04 NOTE — PROGRESS NOTES
PLAN:  Continue IVF @ 125ml/hr  Continue Zosyn  FLD  Labs in AM.  On Protonix, continue Lovenox, SCDs, IS. PT eval.   PPD-plan for SNF placement     ASSESSMENT:  Admit Date: 12/31/2017   4 Days Post-Op  Procedure(s):  LAPAROTOMY EXPLORATORY REDUCTION AND REPAIR OF INCARERATED UMBILICAL HERNIA, SIGMOIDCOLECTOMY AND COLOSTOMY, BIOPSY OF MESENTERIC NODULES, DEBRIDEMENT OF SKIN, SUBCUTANEOUS, FASCIA  ABDOMINAL WALL    Principal Problem:    Incarcerated umbilical hernia (90/13/3264)    Active Problems:    Essential hypertension (9/18/2012)      NATALIIA on CPAP (9/18/2012)      CRI (chronic renal insufficiency) (1/21/2013)         SUBJECTIVE:  Pt confused this AM and not eating well. Needs lots of encouragement from nursing staff. AF, HTN overnight, oxygenating well on room air. Gas and small amount of liquid noted in ostomy bag. No labs this AM.       OBJECTIVE:  Constitutional: Alert oriented cooperative patient in no acute distress; appears stated age   Visit Vitals    /75    Pulse 79    Temp 96.8 °F (36 °C)    Resp 16    Ht 5' 10\" (1.778 m)    Wt 204 lb (92.5 kg)    SpO2 91%    BMI 29.27 kg/m2     Eyes:Sclera are clear. ENMT: no external lesions gross hearing normal; no obvious neck masses, no ear or lip lesions  CV: RRR. Normal perfusion  Resp: No JVD. Breathing is  non-labored; no audible wheezing. CTAB. GI: soft and non-distended; appropriately tender. Incision healing appropriately with staples intact. Stoma is pink and edematous, + gas in ostomy bag, dark blood tinged liquid in bag. + BS X 4 quadrants. Drain with clear, blood-tinged output 10mL/24h. Musculoskeletal: unremarkable with normal function. No embolic signs or cyanosis.    Neuro:  Oriented; moves all 4; no focal deficits  Psychiatric: depressed mood and affect;  no memory impairment      Patient Vitals for the past 24 hrs:   BP Temp Pulse Resp SpO2   01/04/18 0730 170/75 96.8 °F (36 °C) 79 16 91 %   01/04/18 0400 168/74 98.5 °F (36.9 °C) 81 19 90 %   01/04/18 0110 172/73 98 °F (36.7 °C) 84 19 97 %   01/03/18 2025 167/74 99.2 °F (37.3 °C) 87 18 96 %   01/03/18 1604 158/73 97.9 °F (36.6 °C) 83 16 94 %   01/03/18 1140 132/73 98 °F (36.7 °C) 83 16 97 %     Labs:    Recent Labs      01/03/18   0606   WBC  7.6   HGB  7.5*   PLT  325   NA  142   K  4.7   CL  108*   CO2  30   BUN  16   CREA  0.67   GLU  98   TBILI  0.4   SGOT  40*   ALT  29   AP  177*     Mumtaz Arnold, NP

## 2018-01-05 LAB
BACTERIA SPEC CULT: NORMAL
BACTERIA SPEC CULT: NORMAL
ERYTHROCYTE [DISTWIDTH] IN BLOOD BY AUTOMATED COUNT: 16.6 % (ref 11.9–14.6)
HCT VFR BLD AUTO: 28.1 % (ref 35.8–46.3)
HGB BLD-MCNC: 8.2 G/DL (ref 11.7–15.4)
MCH RBC QN AUTO: 23.7 PG (ref 26.1–32.9)
MCHC RBC AUTO-ENTMCNC: 29.2 G/DL (ref 31.4–35)
MCV RBC AUTO: 81.2 FL (ref 79.6–97.8)
PLATELET # BLD AUTO: 402 K/UL (ref 150–450)
PMV BLD AUTO: 9.8 FL (ref 10.8–14.1)
RBC # BLD AUTO: 3.46 M/UL (ref 4.05–5.25)
SERVICE CMNT-IMP: NORMAL
SERVICE CMNT-IMP: NORMAL
WBC # BLD AUTO: 6.7 K/UL (ref 4.3–11.1)

## 2018-01-05 PROCEDURE — 74011250637 HC RX REV CODE- 250/637: Performed by: SURGERY

## 2018-01-05 PROCEDURE — 97166 OT EVAL MOD COMPLEX 45 MIN: CPT

## 2018-01-05 PROCEDURE — 74011250636 HC RX REV CODE- 250/636: Performed by: SURGERY

## 2018-01-05 PROCEDURE — 74011250637 HC RX REV CODE- 250/637: Performed by: PHYSICIAN ASSISTANT

## 2018-01-05 PROCEDURE — 74011250636 HC RX REV CODE- 250/636: Performed by: PHYSICIAN ASSISTANT

## 2018-01-05 PROCEDURE — 65270000029 HC RM PRIVATE

## 2018-01-05 PROCEDURE — 85027 COMPLETE CBC AUTOMATED: CPT | Performed by: NURSE PRACTITIONER

## 2018-01-05 PROCEDURE — 94760 N-INVAS EAR/PLS OXIMETRY 1: CPT

## 2018-01-05 PROCEDURE — 97530 THERAPEUTIC ACTIVITIES: CPT

## 2018-01-05 PROCEDURE — 36415 COLL VENOUS BLD VENIPUNCTURE: CPT | Performed by: NURSE PRACTITIONER

## 2018-01-05 RX ORDER — FAMOTIDINE 20 MG/1
20 TABLET, FILM COATED ORAL DAILY
Status: DISCONTINUED | OUTPATIENT
Start: 2018-01-05 | End: 2018-01-05

## 2018-01-05 RX ORDER — DEXTROSE, SODIUM CHLORIDE, AND POTASSIUM CHLORIDE 5; .45; .15 G/100ML; G/100ML; G/100ML
50 INJECTION INTRAVENOUS CONTINUOUS
Status: DISCONTINUED | OUTPATIENT
Start: 2018-01-05 | End: 2018-01-06

## 2018-01-05 RX ORDER — LISINOPRIL 20 MG/1
20 TABLET ORAL DAILY
Status: DISCONTINUED | OUTPATIENT
Start: 2018-01-05 | End: 2018-01-08 | Stop reason: HOSPADM

## 2018-01-05 RX ORDER — HYDROCHLOROTHIAZIDE 25 MG/1
12.5 TABLET ORAL DAILY
Status: DISCONTINUED | OUTPATIENT
Start: 2018-01-05 | End: 2018-01-08 | Stop reason: HOSPADM

## 2018-01-05 RX ADMIN — Medication 1 AMPULE: at 23:07

## 2018-01-05 RX ADMIN — HYDROCODONE BITARTRATE AND ACETAMINOPHEN 1 TABLET: 5; 325 TABLET ORAL at 14:44

## 2018-01-05 RX ADMIN — ENOXAPARIN SODIUM 30 MG: 30 INJECTION SUBCUTANEOUS at 18:13

## 2018-01-05 RX ADMIN — Medication 5 ML: at 16:31

## 2018-01-05 RX ADMIN — LISINOPRIL 20 MG: 20 TABLET ORAL at 16:29

## 2018-01-05 RX ADMIN — DEXTROSE MONOHYDRATE, SODIUM CHLORIDE, AND POTASSIUM CHLORIDE 50 ML/HR: 50; 4.5; 1.49 INJECTION, SOLUTION INTRAVENOUS at 08:52

## 2018-01-05 RX ADMIN — HYDROCHLOROTHIAZIDE 12.5 MG: 25 TABLET ORAL at 16:29

## 2018-01-05 RX ADMIN — DIPHENHYDRAMINE HYDROCHLORIDE 12.5 MG: 50 INJECTION, SOLUTION INTRAMUSCULAR; INTRAVENOUS at 00:04

## 2018-01-05 RX ADMIN — HYDROCODONE BITARTRATE AND ACETAMINOPHEN 1 TABLET: 5; 325 TABLET ORAL at 05:22

## 2018-01-05 RX ADMIN — FAMOTIDINE 20 MG: 20 TABLET, FILM COATED ORAL at 09:04

## 2018-01-05 RX ADMIN — Medication 1 AMPULE: at 08:52

## 2018-01-05 NOTE — PROGRESS NOTES
PLAN:  Decrease IVF to 50ml/hr. Pt is not taking in much PO per RN. Will plan to d/c IVF tomorrow. D/C drain  Advance to regular diet  Start Pepcid, continue Lovenox, SCDs, IS. PT eval.   PPD-plan for post acute care placement in 1-2 days    ASSESSMENT:  Admit Date: 12/31/2017    5 Days Post-Op  Procedure(s):  LAPAROTOMY EXPLORATORY REDUCTION AND REPAIR OF INCARERATED UMBILICAL HERNIA, SIGMOIDCOLECTOMY AND COLOSTOMY, BIOPSY OF MESENTERIC NODULES, DEBRIDEMENT OF SKIN, SUBCUTANEOUS, FASCIA  ABDOMINAL WALL    Principal Problem:    Incarcerated umbilical hernia (68/61/2531)    Active Problems:    Essential hypertension (9/18/2012)      NATALIIA on CPAP (9/18/2012)      CRI (chronic renal insufficiency) (1/21/2013)         SUBJECTIVE:  Pt is not eating well. Needs lots of encouragement from nursing staff. AF, VSS, oxygenating well on 3L NC. Gas and small amount of liquid noted in ostomy bag. No labs this AM. Pt voiding without issue. Rob drain with 20ml serous output/24 hours. OBJECTIVE:  Constitutional: Alert oriented cooperative patient in no acute distress; appears stated age   Visit Vitals    /79    Pulse 80    Temp 97.8 °F (36.6 °C)    Resp 16    Ht 5' 10\" (1.778 m)    Wt 204 lb (92.5 kg)    SpO2 95%    BMI 29.27 kg/m2     Eyes:Sclera are clear. ENMT: no external lesions gross hearing normal; no obvious neck masses, no ear or lip lesions  CV: RRR. Normal perfusion  Resp: No JVD. Breathing is  non-labored; no audible wheezing. GI: soft and non-distended, non tender. . Incision healing appropriately with staples intact. Stoma is pink and edematous, + gas in ostomy bag, small amount of dark blood tinged liquid in bag. + BS X 4 quadrants. Drain with serous output 20mL/24h. Musculoskeletal: unremarkable with normal function. No embolic signs or cyanosis.    Neuro:  Intermittent confusion; moves all 4; no focal deficits  Psychiatric: depressed mood and affect;  no memory impairment      Patient Vitals for the past 24 hrs:   BP Temp Pulse Resp SpO2   01/05/18 0744 158/79 97.8 °F (36.6 °C) 80 16 95 %   01/05/18 0345 164/74 97.6 °F (36.4 °C) 95 16 95 %   01/04/18 2355 178/73 97.8 °F (36.6 °C) 83 16 93 %   01/04/18 1950 148/72 98.7 °F (37.1 °C) 88 16 96 %   01/04/18 1452 134/67 97.7 °F (36.5 °C) 86 16 95 %   01/04/18 1100 146/73 97.6 °F (36.4 °C) 83 16 94 %     Labs:    Recent Labs      01/05/18   0548  01/03/18   0606   WBC  6.7  7.6   HGB  8.2*  7.5*   PLT  402  325   NA   --   142   K   --   4.7   CL   --   108*   CO2   --   30   BUN   --   16   CREA   --   0.67   GLU   --   98   TBILI   --   0.4   SGOT   --   40*   ALT   --   29   AP   --   177*     Ryan Green

## 2018-01-05 NOTE — PROGRESS NOTES
Problem: Nutrition Deficit  Goal: *Optimize nutritional status  Nutrition  Reason for assessment: LOS day 5  Assessment:   Diet order(s): 1/5: Regular, 1/3: Full Liquids Diet, 12/31: Clear Liquids  Food/Nutrition Patient History:  Patient presents with no acute nutrition risk factors identified by malnutrition screening tool upon admission. The patient's history is unremarkable. The patient was admitted for an incarcerated umbilical hernia and is s/p colostomy. The patient states that her appetite has not been good since her surgery. States that she just hasn't been hungry. Son is present and reports that she seems to do ok with some encouragement. Both son and patient states that prior to admission her appetite was great and she has no po difficulties. Discussed oral nutrition supplements and patient agrees to try some Ensure Enlive supplements while her appetite is poor. Anthropometrics:Height: 5' 10\" (177.8 cm),  Weight: 92.5 kg (204 lb), Weight Source: unknown, Body mass index is 29.27 kg/(m^2). BMI class of normal weight for age >71. Macronutrient needs:  EER:  9951-5778 kcal /day (18-23 kcal/kg listed BW)  EPR:  68-82 grams protein/day (1-1.2 grams/kg IBW)  Intake/Comparative Standards: Average intake for past 3 recorded meal(s): 10%. This potentially meets ~<15% of kcal and ~<15% of protein needs    Nutrition Diagnosis: Inadequate oral intake related to poor appetite as evidenced by patient reports no hunger sensation since surgery and unable to consume meals. Intervention:  Meals and snacks: Continue current diet. Obtained food preferences for standard breakfast order: payton, scrambled eggs, strawberry yogurt and coffee daily. Nutrition Supplement Therapy: Add Ensure Enlive TID (no strawberry)   Nutrition Discharge Plan: Too soon to be determined. [place] Maryclare Gosselin, [address], [address] [place] [address], [phone]

## 2018-01-05 NOTE — PROGRESS NOTES
Problem: Mobility Impaired (Adult and Pediatric)  Goal: *Acute Goals and Plan of Care (Insert Text)  STG:  (1.)Ms. Bradford Painter  will move from supine to sit and sit to supine via log rolling to sidelying in flat bed without siderails with maximal assistance within 3 day(s). (2.)Ms. Bradford Painter  will transfer from bed to chair and chair to bed with maximal assistance using the least restrictive/no device within 3 day(s). (3.)Ms. Bradford Painter  will ambulate with maximal assistance for 5 feet with the least restrictive device within 3 day(s). LTG:  (1.)Ms. Bradford Painter  will move from supine to sit and sit to supine via logrolling  to side in flat bed without siderails with moderate assistance  within 7 day(s). (2.)Ms. Bradford Painter  will transfer from bed to chair and chair to bed with moderate assistance  using the least restrictive/no device within 7 day(s). (3.)Ms. Bradford Painter  will ambulate with moderate assistance  for 25+ feet with the least restrictive/no device within 7 day(s). PHYSICAL THERAPY: Daily Note, Treatment Day: 2nd, PM 1/5/2018  INPATIENT: Hospital Day: 6  [place] / Product Type: Medicare /   Trial Period   NAME/AGE/GENDER: Panchito Mcnally is a 80 y.o. female   PRIMARY DIAGNOSIS: Incarcerated umbilical hernia  Incarcerated umbilical hernia Incarcerated umbilical hernia Incarcerated umbilical hernia  Procedure(s) (LRB):  LAPAROTOMY EXPLORATORY REDUCTION AND REPAIR OF INCARERATED UMBILICAL HERNIA, SIGMOIDCOLECTOMY AND COLOSTOMY, BIOPSY OF MESENTERIC NODULES, DEBRIDEMENT OF SKIN, SUBCUTANEOUS, FASCIA  ABDOMINAL WALL (N/A)  5 Days Post-Op  ICD-10: Treatment Diagnosis:   · Generalized Muscle Weakness (M62.81)  · Other abnormalities of gait and mobility (R26.89)  · History of falling (Z91.81)   Precaution/Allergies:  Review of patient's allergies indicates no known allergies. ASSESSMENT:     Ms. Bradford Painter presents supine in bed.  It was discussed earlier with CM that pt needed to participate with PT. Patient  A little more cooperative with less \"I can'ts\". Needing directional cues to get to EOB. Pt arguing entire treat stating she is not with the old PT anymore, she is with the 'new PT'. Once sitting at EOB, pt looked for any opportunity to return to supine while PT was adjusting lines, furniture or equipment. CM was summoned to room and pt was more cooperative, but still resistant. After standing x2, pt performed SPT over to chair. Left in chair, RN notified about need for alarm, and pt desires  Sheets to be changed. Extra time needed to keep pt from returning BTB prematurely. Improved with less negative comments, effort level was slightly more, but only when CM was present. Main limitation is pts poor proprio fighting fwd lean, by pulling and resisting into bwd lean creating unsafe position. This section established at most recent assessment   PROBLEM LIST (Impairments causing functional limitations):  1. Decreased Strength  2. Decreased ADL/Functional Activities  3. Decreased Transfer Abilities  4. Decreased Ambulation Ability/Technique  5. Decreased Balance  6. Increased Pain  7. Decreased Activity Tolerance  8. Decreased Knowledge of Precautions   INTERVENTIONS PLANNED: (Benefits and precautions of physical therapy have been discussed with the patient.)  1. Balance Exercise  2. Bed Mobility  3. Family Education  4. Gait Training  5. Therapeutic Activites  6. Therapeutic Exercise/Strengthening  7. Transfer Training  8. education  9. Group Therapy     TREATMENT PLAN: Frequency/Duration: 3 times a week for duration of hospital stay  Rehabilitation Potential For Stated Goals: Eusebio Elder REHABILITATION/EQUIPMENT: (at time of discharge pending progress): Due to the probability of continued deficits (see above) this patient will likely need continued skilled physical therapy after discharge. If agreeable.   Equipment:    None at this time              HISTORY:   History of Present Injury/Illness (Reason for Referral):  Per MD note, \"History of Present Illness: Pt is a 80year old WF presenting to the ER earlier today with abdominal pain at the site of her long-standing umbilical hernia. This has been completely asymptomatic until last night, per patient. It has become acutely enlarged and tender prompting her SNF to bring her for evaluation. She denies any antecedent problems from the hernia. She denies fever, chills, nausea,vomiting, or recent alteration of bowel function. She has no prior abdominal surgical history\"  Past Medical History/Comorbidities:   Ms. Jaime Reynolds  has a past medical history of Compression fracture of lumbar spine, non-traumatic ([address] (2007); Depression (9/18/2012); DJD (degenerative joint disease) (9/18/2012); GERD (gastroesophageal reflux disease) (9/18/2012); HTN (hypertension) (9/18/2012); Hypertension; NATALIIA on CPAP (9/18/2012); Osteoporosis (9/18/2012); Other ill-defined conditions(799.89); and Psychiatric disorder. Ms. Jaime Reynolds  has a past surgical history that includes hx heent and hx cataract removal.  Social History/Living Environment:   Home Environment: [address] [place] [address] Name: [facility]  One/Two Story Residence: One story  Living Alone: No  Support Systems: Child(harvey)  Patient Expects to be Discharged to[de-identified] [facility]  Current DME Used/Available at Home: Oxygen, portable, Wheelchair  Prior Level of Function/Work/Activity:  Patient lived alone independently prior to November when she fell and suffered R wrist fracture. Since then has been in rehab and respite care per family. Family would like patient to be in [facility], but are unsure about future plans. Personal Factors:          Sex:  female        Age:  80 y.o.    Number of Personal Factors/Comorbidities that affect the Plan of Care: 1-2: MODERATE COMPLEXITY   EXAMINATION:   Most Recent Physical Functioning:   Gross Assessment:                  Posture:     Balance:  Sitting: Impaired  Sitting - Static: Fair (occasional)  Sitting - Dynamic: Fair (occasional)  Standing: Impaired  Standing - Static: Poor  Standing - Dynamic : Poor Bed Mobility:  Rolling: Minimum assistance  Supine to Sit: Minimum assistance  Scooting: Moderate assistance  Wheelchair Mobility:     Transfers: refused  Sit to Stand: Maximum assistance;Assist x2; Additional time  Stand to Sit: Maximum assistance;Assist x2; Additional time  Stand Pivot Transfers: Maximum assistance  Bed to Chair: Assist x2; Additional time  Gait:            Body Structures Involved:  1. Digestive Structures  2. Joints  3. Muscles  4. Ligaments Body Functions Affected:  1. Sensory/Pain  2. Movement Related  3. Digestive Activities and Participation Affected:  1. Mobility  2. Self Care  3. Domestic Life   Number of elements that affect the Plan of Care: 4+: HIGH COMPLEXITY   CLINICAL PRESENTATION:   Presentation: Evolving clinical presentation with unstable and unpredictable characteristics: HIGH COMPLEXITY   CLINICAL DECISION MAKIN [facility] Inpatient Short Form  How much difficulty does the patient currently have. .. Unable A Lot A Little None   1. Turning over in bed (including adjusting bedclothes, sheets and blankets)? [x] 1   [] 2   [] 3   [] 4   2. Sitting down on and standing up from a chair with arms ( e.g., wheelchair, bedside commode, etc.)   [x] 1   [] 2   [] 3   [] 4   3. Moving from lying on back to sitting on the side of the bed? [x] 1   [] 2   [] 3   [] 4   How much help from another person does the patient currently need. .. Total A Lot A Little None   4. Moving to and from a bed to a chair (including a wheelchair)? [x] 1   [] 2   [] 3   [] 4   5. Need to walk in hospital room? [x] 1   [] 2   [] 3   [] 4   6. Climbing 3-5 steps with a railing? [x] 1   [] 2   [] 3   [] 4   © , Trustees of [address] [place] [address] [address] [address], under license to [employer].  All rights reserved      Score: Initial: 6 Most Recent: X (Date: -- )    Interpretation of Tool:  Represents activities that are increasingly more difficult (i.e. Bed mobility, Transfers, Gait). Score 24 23 22-20 19-15 14-10 9-7 6     Modifier CH CI CJ CK CL CM CN      ? Mobility - Walking and Moving Around:     - CURRENT STATUS: CN - 100% impaired, limited or restricted    - GOAL STATUS: CM - 80%-99% impaired, limited or restricted    - D/C STATUS:  ---------------To be determined---------------  [place] / Product Type: Medicare /      Medical Necessity:     · Patient is expected to demonstrate progress in strength, range of motion, balance, coordination and functional technique to decrease assistance required with all functional mobility. Reason for Services/Other Comments:  · Patient continues to require skilled intervention due to medical complications and patient unable to attend/participate in therapy as expected. Use of outcome tool(s) and clinical judgement create a POC that gives a: Difficult prediction of patient's progress: HIGH COMPLEXITY            TREATMENT:   (In addition to Assessment/Re-Assessment sessions the following treatments were rendered)   Pre-treatment Symptoms/Complaints:    Pain: Initial:   Pain Intensity 1: 0  Post Session:  Unchanged. Therapeutic Activity: (    25min):  Therapeutic activities including Bed transfers to improve mobility, strength, balance and coordination. Required maximal   to promote static and dynamic balance in sitting, promote coordination of bilateral, lower extremity(s) and promote motor control of bilateral, lower extremity(s). Braces/Orthotics/Lines/Etc:   · IV  · O2 Device: Nasal cannula  Treatment/Session Assessment:    · Response to Treatment:  Agreeable to limited movement.   · Interdisciplinary Collaboration:   o Physical Therapist  o Registered Nurse  · After treatment position/precautions:   o Up in chair  o Call light within reach  o RN notified   · Compliance with Program/Exercises: Will assess as treatment progresses. · Recommendations/Intent for next treatment session: \"Next visit will focus on advancements to more challenging activities and reduction in assistance provided\".   Total Treatment Duration:  PT Patient Time In/Time Out  Time In: 1330  Time Out: Pia 116, DPT

## 2018-01-05 NOTE — PROGRESS NOTES
END OF SHIFT NOTE:    INTAKE/OUTPUT  01/04 0701 - 01/05 0700  In: 2339 [I.V.:3279]  Out: 20 [Drains:20]  Voiding: YES  Catheter: NO  Drain:   Colostomy 12/31/17 Left Abdomen (Active)   Drainage Color Sanguinous 1/4/2018 12:33 AM   Site Assessment Clean, dry, intact 1/4/2018 12:33 AM   Output (ml) 0 mL 1/4/2018 12:33 AM       Rob Drain #1 12/31/17 Anterior Abdomen (Active)   Site Assessment Clean, dry, & intact 1/4/2018 12:33 AM   Dressing Status Clean, dry, & intact 1/4/2018 12:33 AM   Drainage Description Serosanguinous 1/4/2018 12:33 AM   Rob Drain Airleak No 1/4/2018 12:33 AM   Status Patent; Charged;Draining;Suction (specify 1/4/2018 12:33 AM   Y Connector Used No 1/4/2018 12:33 AM   Output (ml) 20 ml 1/4/2018  5:09 PM               Flatus: Patient does not have flatus present. Stool:  colostomy. Characteristics:  Stool Assessment  Stool Color: Maroon  Stool Appearance: Watery  Stool Amount: Smear  Stool Source/Status: Colostomy    Emesis: 0 occurrences. Characteristics:        VITAL SIGNS  Patient Vitals for the past 12 hrs:   Temp Pulse Resp BP SpO2   01/05/18 0345 97.6 °F (36.4 °C) 95 16 164/74 95 %   01/04/18 2355 97.8 °F (36.6 °C) 83 16 178/73 93 %   01/04/18 1950 98.7 °F (37.1 °C) 88 16 148/72 96 %       Pain Assessment  Pain Intensity 1: 1 (01/05/18 0522)  Pain Location 1: Abdomen  Pain Intervention(s) 1: Medication (see MAR)  Patient Stated Pain Goal: 0    Ambulating  No    Shift report given to oncoming nurse at the bedside.     Michel Hightower RN

## 2018-01-05 NOTE — PROGRESS NOTES
Spoke to Ms. Grant in room 207, and with her permission, called her son Kerrie Little at [phone] to discuss discharge planning. Ms. Mirna Wilkins has a new colostomy, and was living at [address] [facility] in [place], [place] after being discharged from [facility] acute inpatient re[facility]. She still owns a home, but per son Eva Romo, she is not safe to go home alone. In fact, they are planning on selling her home in February 2018. With a colostomy, she cannot return to [facility] unless she is 100% capable of managing the ostomy herself (not likely). Thus, plan is STR at SNF and then private pay SNF (long-term). [facility] (no preference, preferred equally). Await their review and hopefully bed offers.

## 2018-01-05 NOTE — PROGRESS NOTES
Problem: Self Care Deficits Care Plan (Adult)  Goal: *Acute Goals and Plan of Care (Insert Text)  1. Patient will complete lower body bathing and dressing with minimal assistance and adaptive equipment as needed. 2. Patient will complete toileting with CGA. 3. Patient will tolerate 23 minutes of OT treatment with minimal rest breaks to increase activity tolerance for ADLs. 4. Patient will complete functional transfers with moderate assistance and adaptive equipment as needed. 5. Patient will complete grooming using BUEs equally with setup. 6. Patient will tolerate 10 minutes of UE therapeutic exercise post RUE cast removal.    Timeframe: 7 visits     OCCUPATIONAL THERAPY: Initial Assessment 1/5/2018  INPATIENT: Hospital Day: 6  [place] / Product Type: Medicare /      NAME/AGE/GENDER: Jed Metzger is a 80 y.o. female   PRIMARY DIAGNOSIS:  Incarcerated umbilical hernia  Incarcerated umbilical hernia Incarcerated umbilical hernia Incarcerated umbilical hernia  Procedure(s) (LRB):  LAPAROTOMY EXPLORATORY REDUCTION AND REPAIR OF INCARERATED UMBILICAL HERNIA, SIGMOIDCOLECTOMY AND COLOSTOMY, BIOPSY OF MESENTERIC NODULES, DEBRIDEMENT OF SKIN, SUBCUTANEOUS, FASCIA  ABDOMINAL WALL (N/A)  5 Days Post-Op  ICD-10: Treatment Diagnosis:    · Pain in Right Wrist (M25.531)  · Generalized Muscle Weakness (M62.81)  · Dizziness and Giddiness (R42)   Precautions/Allergies:     Review of patient's allergies indicates no known allergies. ASSESSMENT:     Ms. Stanton Nance presents supine in bed agreeable to OT assessment. Pt states she is tired and requests remaining in bed even with maximal encouragement. Pt is oriented to self, place, and time, able to give month and day but unsure of year. At baseline, pt reports she lives alone and is independent with self care and her son assists as needed. Pt reports wrist fx with cast removal last week.   Pt exhibits limited strength and ROM right wrist.  She tolerates PROM and light active assisted ROM with head of bed raised to 45 degrees (in increments, pt tolerated only minimal increases at a time). Pt's BUE gross motor is WFLs. Ms. Marilou Carlos presents with decreased activity tolerance, and decreased independence for self care, functional mobility, and ADL's. Requires skilled OT to maximize independence with self care tasks and functional transfers. Pt requests returning [address] to neutral position and light off. She is left with all needs in reach, pt instructed to call for assistance; RN aware. This section established at most recent assessment   PROBLEM LIST (Impairments causing functional limitations):  1. Decreased Strength  2. Decreased ADL/Functional Activities  3. Decreased Transfer Abilities  4. Decreased Ambulation Ability/Technique  5. Decreased Activity Tolerance  6. Increased Fatigue  7. Decreased [place] with Home Exercise Program   INTERVENTIONS PLANNED: (Benefits and precautions of occupational therapy have been discussed with the patient.)  1. Activities of daily living training  2. Adaptive equipment training  3. Balance training  4. Donning&doffing training  5. Group therapy  6. Manual therapy training  7. Modalities  8. Therapeutic activity  9. Therapeutic exercise     TREATMENT PLAN: Frequency/Duration: Follow patient 3x/week to address above goals. Rehabilitation Potential For Stated Goals: Good     RECOMMENDED REHABILITATION/EQUIPMENT: (at time of discharge pending progress): Due to the probability of continued deficits (see above) this patient will likely need continued skilled occupational therapy after discharge. Equipment:    TBD              OCCUPATIONAL PROFILE AND HISTORY:   History of Present Injury/Illness (Reason for Referral):  See H&P  Past Medical History/Comorbidities:   Ms. Marilou Carlos  has a past medical history of Compression fracture of lumbar spine, non-traumatic ([address] (2007);  Depression (9/18/2012); DJD (degenerative joint disease) (9/18/2012); GERD (gastroesophageal reflux disease) (9/18/2012); HTN (hypertension) (9/18/2012); Hypertension; NATALIIA on CPAP (9/18/2012); Osteoporosis (9/18/2012); Other ill-defined conditions(799.89); and Psychiatric disorder. Ms. Lori Mueller  has a past surgical history that includes hx heent and hx cataract removal.  Social History/Living Environment:   Home Environment: [address] [place] [address] Name: [facility]  One/Two Story Residence: One story  Living Alone: No  Support Systems: Child(harvey)  Patient Expects to be Discharged to[de-identified] [address] [place] [address]  Current DME Used/Available at Home: Oxygen, portable, Wheelchair  Prior Level of Function/Work/Activity:  Per pt, lives alone and is independent with self care     Number of Personal Factors/Comorbidities that affect the Plan of Care: Expanded review of therapy/medical records (1-2):  MODERATE COMPLEXITY   ASSESSMENT OF OCCUPATIONAL PERFORMANCE[de-identified]   Activities of Daily Living:           Basic ADLs (From Assessment) Complex ADLs (From Assessment)   Basic ADL  Feeding: Minimum assistance  Oral Facial Hygiene/Grooming: Moderate assistance  Bathing: Moderate assistance  Upper Body Dressing: Minimum assistance  Lower Body Dressing: Maximum assistance  Toileting: Moderate assistance Instrumental ADL  Meal Preparation: Total assistance  Homemaking: Total assistance   Grooming/Bathing/Dressing Activities of Daily Living     Cognitive Retraining  Safety/Judgement: Decreased insight into deficits                       Bed/Mat Mobility  Rolling: Minimum assistance  Supine to Sit: Minimum assistance  Sit to Supine: Maximum assistance  Sit to Stand: Maximum assistance;Assist x2; Additional time  Bed to Chair: Assist x2; Additional time  Scooting:  Moderate assistance       Most Recent Physical Functioning:   Gross Assessment:  AROM:  (R wrist with recent fx, limited: LUE WFLs)  Strength:  (R wrist with recent fx, limited: LUE decreased, functional)               Posture:  Posture (WDL): Exceptions to WDL  Posture Assessment: Forward head, Rounded shoulders, Kyphosis  Balance:  Sitting: Impaired  Sitting - Static: Fair (occasional)  Sitting - Dynamic: Fair (occasional)  Standing: Impaired  Standing - Static: Poor  Standing - Dynamic : Poor Bed Mobility:  Rolling: Minimum assistance  Supine to Sit: Minimum assistance  Sit to Supine: Maximum assistance  Scooting: Moderate assistance  Wheelchair Mobility:     Transfers:  Sit to Stand: Maximum assistance;Assist x2; Additional time  Stand to Sit: Maximum assistance;Assist x2; Additional time  Stand Pivot Transfers: Maximum assistance  Bed to Chair: Assist x2; Additional time                Patient Vitals for the past 6 hrs:   BP SpO2 Pulse   18 1130 138/84 97 % 80       Mental Status  Neurologic State: Drowsy  Orientation Level: Oriented to person, Oriented to place, Oriented to time, Disoriented to situation (unsure of year)  Cognition: Decreased attention/concentration, Decreased command following  Perception: Appears intact  Perseveration: No perseveration noted  Safety/Judgement: Decreased insight into deficits                          Physical Skills Involved:  1. Range of Motion  2. Balance  3. Strength  4. Activity Tolerance  5. Fine Motor Control Cognitive Skills Affected (resulting in the inability to perform in a timely and safe manner):  1. Perception  2. Comprehension Psychosocial Skills Affected:  1. Habits/Routines  2. Environmental Adaptation  3. Self-Awareness   Number of elements that affect the Plan of Care: 3-5:  MODERATE COMPLEXITY   CLINICAL DECISION MAKIN [facility] [address] [address] AM-PAC 6 Clicks   Daily Activity Inpatient Short Form  How much help from another person does the patient currently need. .. Total A Lot A Little None   1. Putting on and taking off regular lower body clothing? [] 1   [x] 2   [] 3   [] 4   2. Bathing (including washing, rinsing, drying)?    [] 1   [x] 2   [] 3   [] 4   3. Toileting, which includes using toilet, bedpan or urinal?   [] 1   [] 2   [x] 3   [] 4   4. Putting on and taking off regular upper body clothing? [] 1   [] 2   [x] 3   [] 4   5. Taking care of personal grooming such as brushing teeth? [] 1   [] 2   [x] 3   [] 4   6. Eating meals? [] 1   [] 2   [x] 3   [] 4   © 2007, Trustees of [address] [place] [address] [address] [address], under license to [employer]. All rights reserved      Score:  Initial: 16 Most Recent: X (Date: -- )    Interpretation of Tool:  Represents activities that are increasingly more difficult (i.e. Bed mobility, Transfers, Gait). Score 24 23 22-20 19-15 14-10 9-7 6     Modifier CH CI CJ CK CL CM CN      ? Self Care:     - CURRENT STATUS: CK - 40%-59% impaired, limited or restricted    - GOAL STATUS: CJ - 20%-39% impaired, limited or restricted    - D/C STATUS:  ---------------To be determined---------------  [place] / Product Type: Medicare /      Medical Necessity:     · Patient demonstrates good rehab potential due to higher previous functional level. Reason for Services/Other Comments:  · Patient continues to require skilled intervention due to patient unable to attend/participate in therapy as expected.    Use of outcome tool(s) and clinical judgement create a POC that gives a: MODERATE COMPLEXITY         TREATMENT:   (In addition to Assessment/Re-Assessment sessions the following treatments were rendered)     Pre-treatment Symptoms/Complaints:  Decreased ability to perform ADLs, self care, and functional mobility; decreased tolerance for activities  Pain: Initial:   Pain Intensity 1: 0  Post Session:  0     Assessment/Reassessment only, no treatment provided today    Braces/Orthotics/Lines/Etc:   · O2 Device: Nasal cannula  Treatment/Session Assessment:    · Response to Treatment:  Refused to get out of bed, able to work on right hand/wrist   · Interdisciplinary Collaboration: o Occupational Therapist  o Registered Nurse  · After treatment position/precautions:   o Supine in bed  o Bed/Chair-wheels locked  o Call light within reach  o RN notified   · Compliance with Program/Exercises: Will assess as treatment progresses. · Recommendations/Intent for next treatment session: \"Next visit will focus on advancements to more challenging activities and reduction in assistance provided\".   Total Treatment Duration:  OT Patient Time In/Time Out  Time In: 0911  Time Out: [address], OTR/L

## 2018-01-06 PROCEDURE — 77010033678 HC OXYGEN DAILY

## 2018-01-06 PROCEDURE — 65270000029 HC RM PRIVATE

## 2018-01-06 PROCEDURE — 74011250637 HC RX REV CODE- 250/637: Performed by: SURGERY

## 2018-01-06 PROCEDURE — 74011250636 HC RX REV CODE- 250/636: Performed by: PHYSICIAN ASSISTANT

## 2018-01-06 PROCEDURE — 74011250636 HC RX REV CODE- 250/636: Performed by: SURGERY

## 2018-01-06 PROCEDURE — 94760 N-INVAS EAR/PLS OXIMETRY 1: CPT

## 2018-01-06 RX ADMIN — HYDROCHLOROTHIAZIDE 12.5 MG: 25 TABLET ORAL at 10:17

## 2018-01-06 RX ADMIN — Medication 1 AMPULE: at 22:37

## 2018-01-06 RX ADMIN — Medication 5 ML: at 22:37

## 2018-01-06 RX ADMIN — LISINOPRIL 20 MG: 20 TABLET ORAL at 10:17

## 2018-01-06 RX ADMIN — HYDROCODONE BITARTRATE AND ACETAMINOPHEN 1 TABLET: 5; 325 TABLET ORAL at 13:10

## 2018-01-06 RX ADMIN — DIPHENHYDRAMINE HYDROCHLORIDE 12.5 MG: 50 INJECTION, SOLUTION INTRAMUSCULAR; INTRAVENOUS at 22:37

## 2018-01-06 RX ADMIN — ENOXAPARIN SODIUM 30 MG: 30 INJECTION SUBCUTANEOUS at 18:04

## 2018-01-06 RX ADMIN — Medication 10 ML: at 13:11

## 2018-01-06 RX ADMIN — Medication 1 AMPULE: at 10:21

## 2018-01-06 RX ADMIN — DEXTROSE MONOHYDRATE, SODIUM CHLORIDE, AND POTASSIUM CHLORIDE 50 ML/HR: 50; 4.5; 1.49 INJECTION, SOLUTION INTRAVENOUS at 07:41

## 2018-01-06 RX ADMIN — HYDROCODONE BITARTRATE AND ACETAMINOPHEN 2 TABLET: 5; 325 TABLET ORAL at 04:17

## 2018-01-06 RX ADMIN — HYDROCODONE BITARTRATE AND ACETAMINOPHEN 1 TABLET: 5; 325 TABLET ORAL at 20:44

## 2018-01-06 NOTE — ROUTINE PROCESS
END OF SHIFT NOTE:    INTAKE/OUTPUT  01/05 0701 - 01/06 0700  In: 948 [P.O.:60; I.V.:721]  Out: 10 [Drains:10]  Voiding: YES  Catheter: NO  Drain:   Colostomy 12/31/17 Left Abdomen (Active)   Drainage Color Sanguinous 1/4/2018 12:33 AM   Site Assessment Clean, dry, intact 1/5/2018  7:43 PM   Output (ml) 0 mL 1/6/2018 12:19 AM               Flatus: Patient does not have flatus present. Stool:  0 occurrences. Characteristics:  Stool Assessment  Stool Color: Maroon  Stool Appearance: Watery  Stool Amount: Smear  Stool Source/Status: Colostomy    Emesis: 0 occurrences. Characteristics:        VITAL SIGNS  Patient Vitals for the past 12 hrs:   Temp Pulse Resp BP SpO2   01/06/18 0437 98 °F (36.7 °C) 89 16 165/74 95 %   01/06/18 0021 98.4 °F (36.9 °C) 89 16 144/76 95 %   01/05/18 2005 97.6 °F (36.4 °C) 79 16 129/68 95 %   01/05/18 1948 - - - - 97 %       Pain Assessment  Pain Intensity 1: 0 (01/06/18 0019)  Pain Location 1: Abdomen  Pain Intervention(s) 1: Medication (see MAR)  Patient Stated Pain Goal: 0    Ambulating  No    Shift report given to oncoming nurse at the bedside.     Kae Collins RN

## 2018-01-06 NOTE — PROGRESS NOTES
PLAN:  DC IVF   Regular diet  Start Pepcid, continue Lovenox, SCDs, IS. PT following   PPD-plan for post acute care placement on Monday    ASSESSMENT:  Admit Date: 12/31/2017   6 Days Post-Op  Procedure(s):  LAPAROTOMY EXPLORATORY REDUCTION AND REPAIR OF INCARERATED UMBILICAL HERNIA, SIGMOIDCOLECTOMY AND COLOSTOMY, BIOPSY OF MESENTERIC NODULES, DEBRIDEMENT OF SKIN, SUBCUTANEOUS, FASCIA  ABDOMINAL WALL    Principal Problem:    Incarcerated umbilical hernia (74/87/0381)    Active Problems:    Essential hypertension (9/18/2012)      NATALIIA on CPAP (9/18/2012)      CRI (chronic renal insufficiency) (1/21/2013)       SUBJECTIVE:  Pt resting in bed. Tolerating Regular diet. Continues to require encouragement from staff to eat. Gas and small amount of liquid noted in ostomy bag. Pt voiding without issue. AF, VSS. OBJECTIVE:  Constitutional: Alert, cooperative, no acute distress; appears stated age   Visit Vitals    /64    Pulse 77    Temp 97.2 °F (36.2 °C)    Resp 16    Ht 5' 10\" (1.778 m)    Wt 204 lb (92.5 kg)    SpO2 96%    BMI 29.27 kg/m2     Eyes:Sclera are clear. ENMT: no external lesions gross hearing normal; no obvious neck masses, no ear or lip lesions  CV: RRR. Normal perfusion  Resp: No JVD. Breathing is  non-labored; no audible wheezing. GI: soft and non-distended, non tender. Incision healing appropriately with staples c/d/i. Stoma is pink and edematous, + gas in ostomy bag, small amount of dark blood tinged liquid in bag. + BS X 4 quadrants. Musculoskeletal: unremarkable with normal function. No embolic signs or cyanosis.    Neuro:  Intermittent confusion; moves all 4; no focal deficits  Psychiatric: depressed mood and affect;  no memory impairment      Patient Vitals for the past 24 hrs:   BP Temp Pulse Resp SpO2   01/06/18 0753 148/64 97.2 °F (36.2 °C) 77 16 96 %   01/06/18 0437 165/74 98 °F (36.7 °C) 89 16 95 %   01/06/18 0021 144/76 98.4 °F (36.9 °C) 89 16 95 %   01/05/18 2005 129/68 97.6 °F (36.4 °C) 79 16 95 %   01/05/18 1948 - - - - 97 %   01/05/18 1522 (!) 181/93 97.8 °F (36.6 °C) 95 16 -     Labs:  Recent Labs      01/05/18   0548   WBC  6.7   HGB  8.2*   PLT  402         Mone Hernandez, [facility]-BC    The patient was seen in conjunction with Dr. Tona Turner who independently evaluated the patient, reviewed the chart and agreed with the assessment and plan.

## 2018-01-06 NOTE — PROGRESS NOTES
END OF SHIFT NOTE:    INTAKE/OUTPUT  01/05 0701 - 01/06 0700  In: 431 [P.O.:60; I.V.:721]  Out: 10 [Drains:10]  Voiding: YES  Catheter: NO  Drain:   Colostomy 12/31/17 Left Abdomen (Active)   Drainage Color Serosanguinous 1/6/2018  5:31 PM   Site Assessment Clean, dry, intact 1/6/2018  7:40 AM   Output (ml) 10 mL 1/6/2018  5:31 PM               Flatus: Patient does have flatus present in ostomy bag. Stool:  1 occurrences, small  Characteristics:  Stool Assessment  Stool Color: Maroon  Stool Appearance: Watery  Stool Amount: Smear  Stool Source/Status: Colostomy    Emesis: 0 occurrences. Characteristics:        VITAL SIGNS  Patient Vitals for the past 12 hrs:   Temp Pulse Resp BP SpO2   01/06/18 1504 97.6 °F (36.4 °C) 77 16 133/69 96 %   01/06/18 1154 97.7 °F (36.5 °C) 81 16 152/75 97 %   01/06/18 0753 97.2 °F (36.2 °C) 77 16 148/64 96 %       Pain Assessment  Pain Intensity 1: 1 (01/06/18 1310)  Pain Location 1: Back  Pain Intervention(s) 1: Medication (see MAR)  Patient Stated Pain Goal: 0    Ambulating  No    Shift report given to oncoming nurse at the bedside.     Alisha Hdz RN

## 2018-01-06 NOTE — PROGRESS NOTES
END OF SHIFT NOTE:    INTAKE/OUTPUT  01/04 0701 - 01/05 0700  In: 6527 [I.V.:3279]  Out: 20 [Drains:20]  Voiding: YES  Catheter: NO  Drain:   Colostomy 12/31/17 Left Abdomen (Active)   Drainage Color Sanguinous 1/4/2018 12:33 AM   Site Assessment Clean, dry, intact 1/4/2018 12:33 AM   Output (ml) 0 mL 1/4/2018 12:33 AM               Flatus: Patient does not have flatus present. Stool:  0 occurrences. Characteristics:  Stool Assessment  Stool Color: Maroon  Stool Appearance: Watery  Stool Amount: Smear  Stool Source/Status: Colostomy    Emesis: 0 occurrences. Characteristics:        VITAL SIGNS  Patient Vitals for the past 12 hrs:   Temp Pulse Resp BP SpO2   01/05/18 1522 97.8 °F (36.6 °C) 95 16 (!) 181/93 -   01/05/18 1130 97.8 °F (36.6 °C) 80 16 138/84 97 %   01/05/18 0744 97.8 °F (36.6 °C) 80 16 158/79 95 %       Pain Assessment  Pain Intensity 1: 1 (01/05/18 1452)  Pain Location 1: Abdomen  Pain Intervention(s) 1: Medication (see MAR)  Patient Stated Pain Goal: 0    Ambulating  No, sat on chair with PT assistance, required max assistance of 2 people. Shift report given to oncoming nurse at the bedside.     Darien Villafana RN

## 2018-01-07 PROCEDURE — 65270000029 HC RM PRIVATE

## 2018-01-07 PROCEDURE — 74011250636 HC RX REV CODE- 250/636: Performed by: SURGERY

## 2018-01-07 PROCEDURE — 74011250637 HC RX REV CODE- 250/637: Performed by: SURGERY

## 2018-01-07 RX ORDER — ENOXAPARIN SODIUM 100 MG/ML
40 INJECTION SUBCUTANEOUS EVERY 24 HOURS
Status: DISCONTINUED | OUTPATIENT
Start: 2018-01-07 | End: 2018-01-08 | Stop reason: HOSPADM

## 2018-01-07 RX ADMIN — Medication 10 ML: at 22:00

## 2018-01-07 RX ADMIN — LISINOPRIL 20 MG: 20 TABLET ORAL at 09:32

## 2018-01-07 RX ADMIN — HYDROCHLOROTHIAZIDE 12.5 MG: 25 TABLET ORAL at 09:32

## 2018-01-07 RX ADMIN — ENOXAPARIN SODIUM 40 MG: 40 INJECTION SUBCUTANEOUS at 17:26

## 2018-01-07 RX ADMIN — HYDROCODONE BITARTRATE AND ACETAMINOPHEN 1 TABLET: 5; 325 TABLET ORAL at 13:15

## 2018-01-07 RX ADMIN — Medication 1 AMPULE: at 09:33

## 2018-01-07 RX ADMIN — Medication 10 ML: at 13:25

## 2018-01-07 RX ADMIN — Medication 1 AMPULE: at 22:15

## 2018-01-07 NOTE — PROGRESS NOTES
END OF SHIFT NOTE:    INTAKE/OUTPUT  01/06 0701 - 01/07 0700  In: 6037 [I.V.:1144]  Out: 10   Voiding: YES  Catheter: NO  Drain:   Colostomy 12/31/17 Left Abdomen (Active)   Drainage Color Serosanguinous 1/7/2018  1:24 AM   Site Assessment Clean, dry, intact 1/7/2018  1:24 AM   Output (ml) 10 mL 1/6/2018  5:31 PM               Flatus: Patient does have flatus present. Stool:  0 occurrences. Characteristics:  Stool Assessment  Stool Color: Maroon  Stool Appearance: Watery  Stool Amount: Smear  Stool Source/Status: Colostomy    Emesis: 0 occurrences. Characteristics:        VITAL SIGNS  Patient Vitals for the past 12 hrs:   Temp Pulse Resp BP SpO2   01/07/18 0300 97.7 °F (36.5 °C) 85 16 147/72 92 %   01/06/18 2350 97.6 °F (36.4 °C) 77 16 157/73 97 %   01/06/18 2107 - - - - 97 %   01/06/18 1930 97.5 °F (36.4 °C) 81 16 125/66 95 %       Pain Assessment  Pain Intensity 1: 0 (01/07/18 0124)  Pain Location 1: Abdomen  Pain Intervention(s) 1: Medication (see MAR)  Patient Stated Pain Goal: 0    Ambulating  No    Shift report given to oncoming nurse at the bedside.     Salome Davis RN

## 2018-01-07 NOTE — PROGRESS NOTES
PLAN:  Regular diet  Pepcid, continue Lovenox, SCDs, IS. PT following   PPD-plan for post acute care placement on Monday    ASSESSMENT:  Admit Date: 12/31/2017   7 Days Post-Op  Procedure(s):  LAPAROTOMY EXPLORATORY REDUCTION AND REPAIR OF INCARERATED UMBILICAL HERNIA, SIGMOIDCOLECTOMY AND COLOSTOMY, BIOPSY OF MESENTERIC NODULES, DEBRIDEMENT OF SKIN, SUBCUTANEOUS, FASCIA  ABDOMINAL WALL    Principal Problem:    Incarcerated umbilical hernia (32/56/2348)    Active Problems:    Essential hypertension (9/18/2012)      NATALIIA on CPAP (9/18/2012)      CRI (chronic renal insufficiency) (1/21/2013)       SUBJECTIVE:  Pt resting in bed. Tolerating Regular diet. Gas and small amount of liquid noted in ostomy bag. Pt voiding without issue. AF, VSS. OBJECTIVE:  Constitutional: Alert, cooperative, no acute distress; appears stated age   Visit Vitals    /75    Pulse 80    Temp 97.4 °F (36.3 °C)    Resp 16    Ht 5' 10\" (1.778 m)    Wt 204 lb (92.5 kg)    SpO2 94%    BMI 29.27 kg/m2     Eyes:Sclera are clear. ENMT: no external lesions gross hearing normal; no obvious neck masses, no ear or lip lesions  CV: RRR. Normal perfusion  Resp: No JVD.  Breathing is  non-labored; no audible wheezing. GI: soft and non-distended, non tender. Incision healing appropriately with staples c/d/i.  Stoma is pink and edematous, + gas in ostomy bag, small amount of dark blood tinged liquid in bag. + BS X 4 quadrants.    Musculoskeletal: unremarkable with normal function. No embolic signs or cyanosis.    Neuro:  Intermittent confusion; moves all 4; no focal deficits  Psychiatric: calm mood and affect    Patient Vitals for the past 24 hrs:   BP Temp Pulse Resp SpO2   01/07/18 0735 154/75 97.4 °F (36.3 °C) 80 16 94 %   01/07/18 0300 147/72 97.7 °F (36.5 °C) 85 16 92 %   01/06/18 2350 157/73 97.6 °F (36.4 °C) 77 16 97 %   01/06/18 2107 - - - - 97 %   01/06/18 1930 125/66 97.5 °F (36.4 °C) 81 16 95 %   01/06/18 1504 133/69 97.6 °F (36.4 °C) 77 16 96 %   01/06/18 1154 152/75 97.7 °F (36.5 °C) 81 16 97 %     Labs:  Recent Labs      01/05/18   0548   WBC  6.7   HGB  8.2*   PLT  402       Anu Valerio, FNP-BC    The patient was seen in conjunction with Dr. Abiodun Earl who independently evaluated the patient, reviewed the chart and agreed with the assessment and plan.

## 2018-01-08 VITALS
DIASTOLIC BLOOD PRESSURE: 64 MMHG | RESPIRATION RATE: 17 BRPM | TEMPERATURE: 97.6 F | WEIGHT: 204 LBS | OXYGEN SATURATION: 96 % | HEART RATE: 81 BPM | SYSTOLIC BLOOD PRESSURE: 129 MMHG | HEIGHT: 70 IN | BODY MASS INDEX: 29.2 KG/M2

## 2018-01-08 PROCEDURE — 77030010522: Performed by: ANESTHESIOLOGY

## 2018-01-08 PROCEDURE — 77030010541

## 2018-01-08 PROCEDURE — 74011250637 HC RX REV CODE- 250/637: Performed by: SURGERY

## 2018-01-08 RX ADMIN — LISINOPRIL 20 MG: 20 TABLET ORAL at 07:33

## 2018-01-08 RX ADMIN — HYDROCHLOROTHIAZIDE 12.5 MG: 25 TABLET ORAL at 07:34

## 2018-01-08 RX ADMIN — Medication 1 AMPULE: at 07:33

## 2018-01-08 RX ADMIN — Medication 10 ML: at 06:00

## 2018-01-08 NOTE — PROGRESS NOTES
END OF SHIFT NOTE:    INTAKE/OUTPUT     Voiding: YES  Catheter: NO  Drain:   Colostomy 12/31/17 Left Abdomen (Active)   Drainage Color Serosanguinous 1/7/2018  7:20 PM   Site Assessment Clean, dry, intact 1/8/2018  3:05 AM   Output (ml) 10 mL 1/6/2018  5:31 PM               Flatus: Patient does have flatus present. Stool:  1 occurrences. Characteristics:  Stool Assessment  Stool Color: Maroon  Stool Appearance: Watery  Stool Amount: Smear  Stool Source/Status: Colostomy    Emesis: 0 occurrences. Characteristics:        VITAL SIGNS  Patient Vitals for the past 12 hrs:   Temp Pulse Resp BP SpO2   01/08/18 0542 97.5 °F (36.4 °C) 87 16 174/65 93 %   01/07/18 2345 97.4 °F (36.3 °C) 84 16 148/73 95 %   01/07/18 2040 98 °F (36.7 °C) 96 16 153/79 92 %       Pain Assessment  Pain Intensity 1: 0 (01/08/18 0305)  Pain Location 1: Abdomen  Pain Intervention(s) 1: Medication (see MAR)  Patient Stated Pain Goal: 0    Ambulating  No    Shift report given to oncoming nurse at the bedside.     Viola Shelby RN

## 2018-01-08 NOTE — PROGRESS NOTES
892 y [id number] ambulance transport arranged for 1:30pm to [facility], room 217B, report [phone]. This plan is ok with Ms. Andres Messina in room 207, her son Mr. Marilee Lopez (via phone [phone]; he will help her sign in at [facility]), and GERMAIN Vallejo.

## 2018-01-08 NOTE — PROGRESS NOTES
PLAN:  Regular diet  Pepcid, continue Lovenox, SCDs, IS. PT following   PPD-plan for post acute care placement today    ASSESSMENT:  Admit Date: 12/31/2017   8 Days Post-Op  Procedure(s):  LAPAROTOMY EXPLORATORY REDUCTION AND REPAIR OF INCARERATED UMBILICAL HERNIA, SIGMOIDCOLECTOMY AND COLOSTOMY, BIOPSY OF MESENTERIC NODULES, DEBRIDEMENT OF SKIN, SUBCUTANEOUS, FASCIA  ABDOMINAL WALL    Principal Problem:    Incarcerated umbilical hernia (98/05/4671)    Active Problems:    Essential hypertension (9/18/2012)      NATALIIA on CPAP (9/18/2012)      CRI (chronic renal insufficiency) (1/21/2013)       SUBJECTIVE:  Pt resting in bed. Tolerating Regular diet. Gas and liquid stool noted in ostomy bag. Pt voiding without issue. AF, VSS, 3L NC. OBJECTIVE:  Constitutional: Alert, cooperative, no acute distress; appears stated age   Visit Vitals    /64    Pulse 81    Temp 97.6 °F (36.4 °C)    Resp 17    Ht 5' 10\" (1.778 m)    Wt 204 lb (92.5 kg)    SpO2 96%    BMI 29.27 kg/m2     Eyes:Sclera are clear. ENMT: no external lesions gross hearing normal; no obvious neck masses, no ear or lip lesions  CV: RRR. Normal perfusion  Resp: No JVD.  Breathing is  non-labored; no audible wheezing. GI: soft and non-distended, non tender. Incision healing appropriately.  Stoma is pink and edematous, + gas and stool in ostomy bag, + BS X 4 quadrants.    Musculoskeletal: unremarkable with normal function. No embolic signs or cyanosis.    Neuro:  Intermittent confusion; moves all 4; no focal deficits  Psychiatric: calm mood and affect    Patient Vitals for the past 24 hrs:   BP Temp Pulse Resp SpO2   01/08/18 0730 129/64 97.6 °F (36.4 °C) 81 17 96 %   01/08/18 0542 174/65 97.5 °F (36.4 °C) 87 16 93 %   01/07/18 2345 148/73 97.4 °F (36.3 °C) 84 16 95 %   01/07/18 2040 153/79 98 °F (36.7 °C) 96 16 92 %   01/07/18 1539 146/54 97.7 °F (36.5 °C) 84 16 93 %   01/07/18 1144 149/69 97.9 °F (36.6 °C) 81 16 91 %     Labs:  No results for input(s): WBC, HGB, PLT, NA, K, CL, CO2, BUN, CREA, GLU, PTP, INR, APTT, TBIL, TBILI, CBIL, SGOT, GPT, ALT, AP, AML, LPSE, LCAD, NH4, TROPT, TROIQ, HGBEXT, PLTEXT, HGBEXT, PLTEXT in the last 72 hours.     No lab exists for component: Nicko WOO, [address]

## 2018-01-08 NOTE — DISCHARGE SUMMARY
[facility] [address]  [place], [address] [facility]  [phone]   Discharge Summary     Bassem Johansen  MRN: [id number]     : 1923     Age: 80 y. o. Admit date: 2017     Discharge date: Attending Physician: Anayeli Buchanan NP  Primary Discharge Diagnosis:   Principal Problem:    Incarcerated umbilical hernia ()    Active Problems:    Essential hypertension (2012)      NATALIIA on CPAP (2012)      CRI (chronic renal insufficiency) (2013)      Primary Operations or Procedures Performed :  Procedure(s):  LAPAROTOMY EXPLORATORY REDUCTION AND REPAIR OF INCARERATED UMBILICAL HERNIA, SIGMOIDCOLECTOMY AND COLOSTOMY, BIOPSY OF MESENTERIC NODULES, DEBRIDEMENT OF SKIN, SUBCUTANEOUS, FASCIA  ABDOMINAL WALL     Brief History and Reason for Admission: Bassem Johansen was admitted with the following history of present illness. Pt is a 80year old WF presenting to the ER earlier today with abdominal pain at the site of her long-standing umbilical hernia. This has been completely asymptomatic until last night, per patient. It has become acutely enlarged and tender prompting her SNF to bring her for evaluation. She denies any antecedent problems from the hernia. She denies fever, chills, nausea,vomiting, or recent alteration of bowel function. She has no prior abdominal surgical history          Hospital Course: She underwent surgery on 17 for LAPAROTOMY EXPLORATORY REDUCTION AND REPAIR OF INCARERATED UMBILICAL HERNIA, SIGMOIDCOLECTOMY AND COLOSTOMY, BIOPSY OF MESENTERIC NODULES, DEBRIDEMENT OF SKIN, SUBCUTANEOUS, FASCIA  ABDOMINAL WALL. The post-operative course was as expected without complications.      Condition at Discharge: good    Discharge Medications:   Current Discharge Medication List      CONTINUE these medications which have NOT CHANGED    Details   acetaminophen (TYLENOL EXTRA STRENGTH) 500 mg tablet Take  by mouth every six (6) hours as needed for Pain. lisinopril (PRINIVIL, ZESTRIL) 20 mg tablet TAKE 1 TABLET BY MOUTH DAILY  Qty: 30 Tab, Refills: 12      Hydrochlorothiazide (HYDRODIURIL) 12.5 mg tablet Take 1 Tab by mouth daily. Qty: 30 Tab, Refills: 12         STOP taking these medications       HYDROcodone-acetaminophen (NORCO) 7.5-325 mg per tablet Comments:   Reason for Stopping:                 Disposition/Discharge Instructions/Follow-up Care:          Follow-up with Dr. Dionte Dixon in 2 weeks. Keep incisions clean and dry, may remain uncovered. Do not apply lotions, creams or ointments to incisions. D/C staples on 1/14/18.      Diet - as tolerated - Soft foods diet. Activity - ambulate - as tolerated - no heavy lifting >10lb.   May shower - no tub baths or soaking/submerging.     If problems or questions arise, please call our office at [phone].     Greater than 30 minutes were spent discharging the patient    Signed:  Irlanda Canchola NP   1/8/2018  10:20 AM

## 2018-01-08 NOTE — DISCHARGE INSTRUCTIONS
Discharge Instructions/Follow-up Plans:   MD Instructions:     Follow-up with Dr. Jamari Campbell in 2 weeks. Keep incisions clean and dry, may remain uncovered. Do not apply lotions, creams or ointments to incisions. D/C staples on 1/14/18.      Diet - as tolerated - Soft foods diet. Activity - ambulate - as tolerated - no heavy lifting >10lb.   May shower - no tub baths or soaking/submerging.     If problems or questions arise, please call our office at [phone].     Greater than 30 minutes were spent discharging the patient

## 2018-01-08 NOTE — PROGRESS NOTES
TRANSFER - OUT REPORT:    Verbal report given to Shannan Diamond on Sakshi Brink  being transferred to [facility] for routine progression of care       Report consisted of patients Situation, Background, Assessment and   Recommendations(SBAR). Information from the following report(s) SBAR, Kardex, MAR and Accordion was reviewed with the receiving nurse. Lines:       Opportunity for questions and clarification was provided.       Patient transported with:   O2 @ 2 liters

## 2018-01-09 ENCOUNTER — PATIENT OUTREACH (OUTPATIENT)
Dept: CASE MANAGEMENT | Age: 83
End: 2018-01-09

## 2018-01-09 NOTE — PROGRESS NOTES
This note will not be viewable in [address] [facility] ([address] [facility],[address]). Patient will be included in weekly care coordination calls until discharge. Will forward chart to Nicolette Grimaldo, [address] [facility] and close my case.

## 2018-01-15 ENCOUNTER — HOSPITAL ENCOUNTER (EMERGENCY)
Age: 83
Discharge: HOME | End: 2018-01-16
Attending: EMERGENCY MEDICINE
Payer: MEDICARE

## 2018-01-15 ENCOUNTER — APPOINTMENT (OUTPATIENT)
Dept: GENERAL RADIOLOGY | Age: 83
End: 2018-01-15
Attending: EMERGENCY MEDICINE
Payer: MEDICARE

## 2018-01-15 DIAGNOSIS — D64.9 CHRONIC ANEMIA: Primary | ICD-10-CM

## 2018-01-15 DIAGNOSIS — R07.89 ATYPICAL CHEST PAIN: ICD-10-CM

## 2018-01-15 LAB
ALBUMIN SERPL-MCNC: 2.5 G/DL (ref 3.2–4.6)
ALBUMIN/GLOB SERPL: 0.6 {RATIO} (ref 1.2–3.5)
ALP SERPL-CCNC: 122 U/L (ref 50–136)
ALT SERPL-CCNC: 16 U/L (ref 12–65)
ANION GAP SERPL CALC-SCNC: 4 MMOL/L (ref 7–16)
AST SERPL-CCNC: 21 U/L (ref 15–37)
BASOPHILS # BLD: 0 K/UL (ref 0–0.2)
BASOPHILS NFR BLD: 0 % (ref 0–2)
BILIRUB SERPL-MCNC: 0.4 MG/DL (ref 0.2–1.1)
BNP SERPL-MCNC: 58 PG/ML
BUN SERPL-MCNC: 25 MG/DL (ref 8–23)
CALCIUM SERPL-MCNC: 9.4 MG/DL (ref 8.3–10.4)
CHLORIDE SERPL-SCNC: 96 MMOL/L (ref 98–107)
CO2 SERPL-SCNC: 36 MMOL/L (ref 21–32)
CREAT SERPL-MCNC: 1.04 MG/DL (ref 0.6–1)
DIFFERENTIAL METHOD BLD: ABNORMAL
EOSINOPHIL # BLD: 0 K/UL (ref 0–0.8)
EOSINOPHIL NFR BLD: 0 % (ref 0.5–7.8)
ERYTHROCYTE [DISTWIDTH] IN BLOOD BY AUTOMATED COUNT: 17.2 % (ref 11.9–14.6)
GLOBULIN SER CALC-MCNC: 3.9 G/DL (ref 2.3–3.5)
GLUCOSE SERPL-MCNC: 100 MG/DL (ref 65–100)
HCT VFR BLD AUTO: 28.6 % (ref 35.8–46.3)
HGB BLD-MCNC: 8.6 G/DL (ref 11.7–15.4)
IMM GRANULOCYTES # BLD: 0 K/UL (ref 0–0.5)
IMM GRANULOCYTES NFR BLD AUTO: 0 % (ref 0–5)
LYMPHOCYTES # BLD: 1.1 K/UL (ref 0.5–4.6)
LYMPHOCYTES NFR BLD: 14 % (ref 13–44)
MCH RBC QN AUTO: 24.4 PG (ref 26.1–32.9)
MCHC RBC AUTO-ENTMCNC: 30.1 G/DL (ref 31.4–35)
MCV RBC AUTO: 81 FL (ref 79.6–97.8)
MONOCYTES # BLD: 1 K/UL (ref 0.1–1.3)
MONOCYTES NFR BLD: 13 % (ref 4–12)
NEUTS SEG # BLD: 5.8 K/UL (ref 1.7–8.2)
NEUTS SEG NFR BLD: 73 % (ref 43–78)
PLATELET # BLD AUTO: 376 K/UL (ref 150–450)
PMV BLD AUTO: 10.2 FL (ref 10.8–14.1)
POTASSIUM SERPL-SCNC: 4.2 MMOL/L (ref 3.5–5.1)
PROT SERPL-MCNC: 6.4 G/DL (ref 6.3–8.2)
RBC # BLD AUTO: 3.53 M/UL (ref 4.05–5.25)
SODIUM SERPL-SCNC: 136 MMOL/L (ref 136–145)
TROPONIN I SERPL-MCNC: <0.04 NG/ML (ref 0.02–0.05)
WBC # BLD AUTO: 7.9 K/UL (ref 4.3–11.1)

## 2018-01-15 PROCEDURE — 93005 ELECTROCARDIOGRAM TRACING: CPT | Performed by: EMERGENCY MEDICINE

## 2018-01-15 PROCEDURE — 99285 EMERGENCY DEPT VISIT HI MDM: CPT | Performed by: EMERGENCY MEDICINE

## 2018-01-15 PROCEDURE — 71046 X-RAY EXAM CHEST 2 VIEWS: CPT

## 2018-01-15 PROCEDURE — 85025 COMPLETE CBC W/AUTO DIFF WBC: CPT | Performed by: EMERGENCY MEDICINE

## 2018-01-15 PROCEDURE — 84484 ASSAY OF TROPONIN QUANT: CPT | Performed by: EMERGENCY MEDICINE

## 2018-01-15 PROCEDURE — 80053 COMPREHEN METABOLIC PANEL: CPT | Performed by: EMERGENCY MEDICINE

## 2018-01-15 PROCEDURE — 83880 ASSAY OF NATRIURETIC PEPTIDE: CPT | Performed by: EMERGENCY MEDICINE

## 2018-01-16 VITALS
SYSTOLIC BLOOD PRESSURE: 119 MMHG | OXYGEN SATURATION: 95 % | RESPIRATION RATE: 18 BRPM | DIASTOLIC BLOOD PRESSURE: 55 MMHG | TEMPERATURE: 98.1 F | HEART RATE: 98 BPM

## 2018-01-16 LAB
ATRIAL RATE: 96 BPM
CALCULATED P AXIS, ECG09: 35 DEGREES
CALCULATED R AXIS, ECG10: -15 DEGREES
CALCULATED T AXIS, ECG11: 33 DEGREES
DIAGNOSIS, 93000: NORMAL
P-R INTERVAL, ECG05: 230 MS
Q-T INTERVAL, ECG07: 320 MS
QRS DURATION, ECG06: 82 MS
QTC CALCULATION (BEZET), ECG08: 404 MS
TROPONIN I BLD-MCNC: 0.01 NG/ML (ref 0.02–0.05)
VENTRICULAR RATE, ECG03: 96 BPM

## 2018-01-16 PROCEDURE — 84484 ASSAY OF TROPONIN QUANT: CPT

## 2018-01-16 NOTE — ED TRIAGE NOTES
Pt presents to ER via EMS from [facility] for low Hgb determined yesterday, pt's family refused transfusion. Pt had c/o CP 3 hrs ago that resolved after PO tylenol, EMS had to wake pt to transport, no complaints at this time, no CP.

## 2018-01-16 NOTE — ED NOTES
I have reviewed discharge instructions with the grant. The caregiver verbalized understanding. Patient left ED via Discharge Method: stretcher to Nursing Home with ( transport). Opportunity for questions and clarification provided. Patient given 0 scripts. To continue your aftercare when you leave the hospital, you may receive an automated call from our care team to check in on how you are doing. This is a free service and part of our promise to provide the best care and service to meet your aftercare needs.  If you have questions, or wish to unsubscribe from this service please call [phone]. Thank you for Choosing our [employer] Emergency Department.

## 2018-01-16 NOTE — ED PROVIDER NOTES
HPI Comments: Patient presents to the ER after reportedly had an episode of some chest pain earlier this evening. Reports symptoms started approximately 3 hours prior to arrival.  Denies any associated vomiting or diaphoresis. Pain is completely resolved. The nursing staff at rehabilitation facility states that patient was noted to have a low hemoglobin on labs drawn yesterday however patient and family has decided against any blood transfusions. Patient had a recent hospitalization for incarcerated hernia requiring abdominal surgery. Denies any fevers or worsening cough    Patient is a 80 y.o. female presenting with abnormal lab results. The history is provided by the patient. Abnormal Lab Results   This is a new problem. The current episode started more than 2 days ago. The problem has not changed since onset. Associated symptoms include chest pain. Pertinent negatives include no abdominal pain and no headaches. Past Medical History:   Diagnosis Date    Compression fracture of lumbar spine, non-traumatic ([address] 2007    Depression 9/18/2012    DJD (degenerative joint disease) 9/18/2012    GERD (gastroesophageal reflux disease) 9/18/2012    HTN (hypertension) 9/18/2012    Hypertension     NATALIIA on CPAP 9/18/2012    Osteoporosis 9/18/2012    Other ill-defined conditions(799.89)     back injury in Oct    Psychiatric disorder     anixety       Past Surgical History:   Procedure Laterality Date    HX CATARACT REMOVAL      HX HEENT      tonsillectomy         Family History:   Problem Relation Age of Onset    Stroke Mother        Social History     Social History    Marital status:      Spouse name: N/A    Number of children: N/A    Years of education: N/A     Occupational History    Not on file.      Social History Main Topics    Smoking status: Never Smoker    Smokeless tobacco: Never Used    Alcohol use No    Drug use: No    Sexual activity: No     Other Topics Concern    Not on file     Social History Narrative    / 3 children         ALLERGIES: Review of patient's allergies indicates no known allergies. Review of Systems   Constitutional: Negative for diaphoresis, fatigue and fever. HENT: Negative for congestion and dental problem. Eyes: Negative for photophobia and redness. Respiratory: Negative for chest tightness. Cardiovascular: Positive for chest pain. Gastrointestinal: Negative for abdominal pain and anal bleeding. Endocrine: Negative for polydipsia, polyphagia and polyuria. Genitourinary: Negative for dysuria and urgency. Musculoskeletal: Negative for back pain and gait problem. Skin: Negative for pallor and rash. Allergic/Immunologic: Negative for food allergies and immunocompromised state. Neurological: Negative for light-headedness and headaches. Hematological: Negative for adenopathy. Does not bruise/bleed easily. Psychiatric/Behavioral: Negative for behavioral problems and confusion. All other systems reviewed and are negative. There were no vitals filed for this visit. Physical Exam   Constitutional: She is oriented to person, place, and time. She appears well-developed and well-nourished. HENT:   Head: Normocephalic and atraumatic. Mouth/Throat: Oropharynx is clear and moist.   Eyes: Conjunctivae and EOM are normal. Pupils are equal, round, and reactive to light. No scleral icterus. Neck: Normal range of motion. Neck supple. No tracheal deviation present. No thyromegaly present. Cardiovascular: Normal rate, regular rhythm and intact distal pulses. Pulmonary/Chest: Effort normal. No respiratory distress. She has wheezes. Abdominal: Soft. Bowel sounds are normal. She exhibits no distension. Musculoskeletal: Normal range of motion. She exhibits no edema or deformity. Neurological: She is alert and oriented to person, place, and time. She has normal reflexes. No cranial nerve deficit.    Nursing note and vitals reviewed. MDM  Number of Diagnoses or Management Options  Atypical chest pain:   Chronic anemia:   Diagnosis management comments: Check basic labs here, chest x-ray, EKG as well as troponin  Patient currently is asymptomatic, we'll continue to monitor    2:09 AM  Lab stable including a hemoglobin 8.6 which is unchanged  Chest x-ray shows linear bibasilar atelectasis although no focal pneumonia or obvious signs of volume overload noted    EKG shows no acute ischemic changes. Cardiac enzymes are negative ×2.   Patient remains asymptomatic, resting comfortably upon re-assessment, vitals remain stable and patient appears stable for discharge         Amount and/or Complexity of Data Reviewed  Clinical lab tests: ordered and reviewed  Tests in the radiology section of CPT®: ordered and reviewed    Risk of Complications, Morbidity, and/or Mortality  Presenting problems: moderate  Diagnostic procedures: moderate  Management options: moderate      ED Course       Procedures

## 2018-01-16 NOTE — DISCHARGE INSTRUCTIONS
Anemia: Care Instructions  Your Care Instructions    Anemia is a low level of red blood cells, which carry oxygen throughout your body. Many things can cause anemia. Lack of iron is one of the most common causes. Your body needs iron to make hemoglobin, a substance in red blood cells that carries oxygen from the lungs to your body's cells. Without enough iron, the body produces fewer and smaller red blood cells. As a result, your body's cells do not get enough oxygen, and you feel tired and weak. And you may have trouble concentrating. Bleeding is the most common cause of a lack of iron. You may have heavy menstrual bleeding or bleeding caused by conditions such as ulcers, hemorrhoids, or cancer. Regular use of aspirin or other anti-inflammatory medicines (such as ibuprofen) also can cause bleeding in some people. A lack of iron in your diet also can cause anemia, especially at times when the body needs more iron, such as during pregnancy, infancy, and the teen years. Your doctor may have prescribed iron pills. It may take several months of treatment for your iron levels to return to normal. Your doctor also may suggest that you eat foods that are rich in iron, such as meat and beans. There are many other causes of anemia. It is not always due to a lack of iron. Finding the specific cause of your anemia will help your doctor find the right treatment for you. Follow-up care is a key part of your treatment and safety. Be sure to make and go to all appointments, and call your doctor if you are having problems. It's also a good idea to know your test results and keep a list of the medicines you take. How can you care for yourself at home? · Take your medicines exactly as prescribed. Call your doctor if you think you are having a problem with your medicine. · If your doctor recommends iron pills, take them as directed:  ¨ Try to take the pills on an empty stomach about 1 hour before or 2 hours after meals. But you may need to take iron with food to avoid an upset stomach. ¨ Do not take antacids or drink milk or caffeine drinks (such as coffee, tea, or cola) at the same time or within 2 hours of the time that you take your iron. They can make it hard for your body to absorb the iron. ¨ Vitamin C (from food or supplements) helps your body absorb iron. Try taking iron pills with a glass of orange juice or some other food that is high in vitamin C, such as citrus fruits. ¨ Iron pills may cause stomach problems, such as heartburn, nausea, diarrhea, constipation, and cramps. Be sure to drink plenty of fluids, and include fruits, vegetables, and fiber in your diet each day. Iron pills often make your bowel movements dark or green. ¨ If you forget to take an iron pill, do not take a double dose of iron the next time you take a pill. ¨ Keep iron pills out of the reach of small children. An overdose of iron can be very dangerous. · Follow your doctor's advice about eating iron-rich foods. These include red meat, shellfish, poultry, eggs, beans, raisins, whole-grain bread, and leafy green vegetables. · Steam vegetables to help them keep their iron content. When should you call for help? Call 911 anytime you think you may need emergency care. For example, call if:  ? · You have symptoms of a heart attack. These may include:  ¨ Chest pain or pressure, or a strange feeling in the chest.  ¨ Sweating. ¨ Shortness of breath. ¨ Nausea or vomiting. ¨ Pain, pressure, or a strange feeling in the back, neck, jaw, or upper belly or in one or both shoulders or arms. ¨ Lightheadedness or sudden weakness. ¨ A fast or irregular heartbeat. After you call 911, the  may tell you to chew 1 adult-strength or 2 to 4 low-dose aspirin. Wait for an ambulance. Do not try to drive yourself. ? · You passed out (lost consciousness).    ?Call your doctor now or seek immediate medical care if:  ? · You have new or increased shortness of breath. ? · You are dizzy or lightheaded, or you feel like you may faint. ? · Your fatigue and weakness continue or get worse. ? · You have any abnormal bleeding, such as:  ¨ Nosebleeds. ¨ Vaginal bleeding that is different (heavier, more frequent, at a different time of the month) than what you are used to. ¨ Bloody or black stools, or rectal bleeding. ¨ Bloody or pink urine. ? Watch closely for changes in your health, and be sure to contact your doctor if:  ? · You do not get better as expected. Where can you learn more? Go to http://di-angy.info/. Enter R301 in the search box to learn more about \"Anemia: Care Instructions. \"  Current as of: October 13, 2016  Content Version: 11.4  © 4587-3488 [employer]. Care instructions adapted under license by [employer] (which disclaims liability or warranty for this information). If you have questions about a medical condition or this instruction, always ask your healthcare professional. [place] [address] any warranty or liability for your use of this information. Chest Pain: Care Instructions  Your Care Instructions    There are many things that can cause chest pain. Some are not serious and will get better on their own in a few days. But some kinds of chest pain need more testing and treatment. Your doctor may have recommended a follow-up visit in the next 8 to 12 hours. If you are not getting better, you may need more tests or treatment. Even though your doctor has released you, you still need to watch for any problems. The doctor carefully checked you, but sometimes problems can develop later. If you have new symptoms or if your symptoms do not get better, get medical care right away. If you have worse or different chest pain or pressure that lasts more than 5 minutes or you passed out (lost consciousness), call 911 or seek other emergency help right away.    A medical visit is only one step in your treatment. Even if you feel better, you still need to do what your doctor recommends, such as going to all suggested follow-up appointments and taking medicines exactly as directed. This will help you recover and help prevent future problems. How can you care for yourself at home? · Rest until you feel better. · Take your medicine exactly as prescribed. Call your doctor if you think you are having a problem with your medicine. · Do not drive after taking a prescription pain medicine. When should you call for help? Call 911 if:  ? · You passed out (lost consciousness). ? · You have severe difficulty breathing. ? · You have symptoms of a heart attack. These may include:  ¨ Chest pain or pressure, or a strange feeling in your chest.  ¨ Sweating. ¨ Shortness of breath. ¨ Nausea or vomiting. ¨ Pain, pressure, or a strange feeling in your back, neck, jaw, or upper belly or in one or both shoulders or arms. ¨ Lightheadedness or sudden weakness. ¨ A fast or irregular heartbeat. After you call 911, the  may tell you to chew 1 adult-strength or 2 to 4 low-dose aspirin. Wait for an ambulance. Do not try to drive yourself. ?Call your doctor today if:  ? · You have any trouble breathing. ? · Your chest pain gets worse. ? · You are dizzy or lightheaded, or you feel like you may faint. ? · You are not getting better as expected. ? · You are having new or different chest pain. Where can you learn more? Go to http://di-angy.info/. Enter A120 in the search box to learn more about \"Chest Pain: Care Instructions. \"  Current as of: March 20, 2017  Content Version: 11.4  © 1654-2414 [employer]. Care instructions adapted under license by [employer] (which disclaims liability or warranty for this information).  If you have questions about a medical condition or this instruction, always ask your healthcare professional. [employer] disclaims any warranty or liability for your use of this information.

## 2024-05-13 NOTE — PROGRESS NOTES
Chart reviewed per Risk Level 4. Patient currently in [address] [facility]. Case not opened at this time. Statement Selected

## (undated) DEVICE — SOLUTION IV 1000ML 0.9% SOD CHL

## (undated) DEVICE — SUTURE VCRL SZ 3-0 L18IN ABSRB UD L26MM SH 1/2 CIR J864D

## (undated) DEVICE — BLADE ELECTRODE: Brand: EDGE

## (undated) DEVICE — STAPLER INT L75MM CUT LN L73MM STPL LN L77MM BLU B FRM 8

## (undated) DEVICE — TRAY CATH 16F URIN MTR LTX -- CONVERT TO ITEM 363111

## (undated) DEVICE — SUTURE PDS II SZ 1 L96IN ABSRB VLT TP-1 L65MM 1/2 CIR Z880G

## (undated) DEVICE — SURGICAL PROCEDURE PACK BASIC ST FRANCIS

## (undated) DEVICE — SHEET, T, LAPAROTOMY, STERILE: Brand: MEDLINE

## (undated) DEVICE — SUTURE PROL SZ 2-0 L36IN NONABSORBABLE BLU SH L26MM 1/2 CIR 8523H

## (undated) DEVICE — SUTURE PROL SZ 1 L30IN NONABSORBABLE BLU L40MM CT 1/2 CIR 8435H

## (undated) DEVICE — AMD ANTIMICROBIAL GAUZE SPONGES,12 PLY USP TYPE VII, 0.2% POLYHEXAMETHYLENE BIGUANIDE HCI (PHMB): Brand: CURITY

## (undated) DEVICE — COLOSTOMY/ILEOSTOMY KIT,FLEXWEAR: Brand: NEW IMAGE

## (undated) DEVICE — SUT ETHLN 3-0 18IN PS1 BLK --

## (undated) DEVICE — SPONGE LAP 18X18IN STRL -- 5/PK

## (undated) DEVICE — SUTURE PERMAHAND SZ 2-0 L12X18IN NONABSORBABLE BLK SILK A185H

## (undated) DEVICE — Device

## (undated) DEVICE — (D)PREP SKN CHLRAPRP APPL 26ML -- CONVERT TO ITEM 371833

## (undated) DEVICE — REM POLYHESIVE ADULT PATIENT RETURN ELECTRODE: Brand: VALLEYLAB

## (undated) DEVICE — KENDALL SCD EXPRESS SLEEVES, KNEE LENGTH, MEDIUM: Brand: KENDALL SCD

## (undated) DEVICE — 2000CC GUARDIAN II: Brand: GUARDIAN

## (undated) DEVICE — SLIM BODY SKIN STAPLER: Brand: APPOSE ULC

## (undated) DEVICE — DRAIN WND RND 19FR FULL FLTD --

## (undated) DEVICE — SUTURE PERMAHAND SZ 3-0 L30IN NONABSORBABLE BLK SILK BRAID A304H